# Patient Record
Sex: FEMALE | Race: BLACK OR AFRICAN AMERICAN | NOT HISPANIC OR LATINO | Employment: FULL TIME | URBAN - METROPOLITAN AREA
[De-identification: names, ages, dates, MRNs, and addresses within clinical notes are randomized per-mention and may not be internally consistent; named-entity substitution may affect disease eponyms.]

---
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
## 2022-05-25 ENCOUNTER — HOSPITAL ENCOUNTER (EMERGENCY)
Facility: HOSPITAL | Age: 22
Discharge: HOME | End: 2022-05-25
Attending: EMERGENCY MEDICINE
Payer: MEDICAID

## 2022-05-25 VITALS
DIASTOLIC BLOOD PRESSURE: 71 MMHG | TEMPERATURE: 98 F | RESPIRATION RATE: 18 BRPM | OXYGEN SATURATION: 95 % | WEIGHT: 140 LBS | BODY MASS INDEX: 30.3 KG/M2 | HEART RATE: 77 BPM | SYSTOLIC BLOOD PRESSURE: 118 MMHG

## 2022-05-25 DIAGNOSIS — S39.012A LUMBAR STRAIN, INITIAL ENCOUNTER: Primary | ICD-10-CM

## 2022-05-25 LAB
B-HCG UR QL: NEGATIVE
CTP QC/QA: YES

## 2022-05-25 PROCEDURE — 99284 EMERGENCY DEPT VISIT MOD MDM: CPT | Mod: 25,ER

## 2022-05-25 PROCEDURE — 25000003 PHARM REV CODE 250: Mod: ER | Performed by: NURSE PRACTITIONER

## 2022-05-25 PROCEDURE — 81025 URINE PREGNANCY TEST: CPT | Mod: ER | Performed by: EMERGENCY MEDICINE

## 2022-05-25 RX ORDER — METHOCARBAMOL 500 MG/1
1000 TABLET, FILM COATED ORAL EVERY 8 HOURS PRN
Qty: 18 TABLET | Refills: 0 | Status: SHIPPED | OUTPATIENT
Start: 2022-05-25 | End: 2022-05-28

## 2022-05-25 RX ORDER — NAPROXEN 250 MG/1
500 TABLET ORAL
Status: COMPLETED | OUTPATIENT
Start: 2022-05-25 | End: 2022-05-25

## 2022-05-25 RX ORDER — NAPROXEN 500 MG/1
500 TABLET ORAL EVERY 12 HOURS
Qty: 10 TABLET | Refills: 0 | Status: SHIPPED | OUTPATIENT
Start: 2022-05-25 | End: 2022-05-30

## 2022-05-25 RX ADMIN — NAPROXEN 500 MG: 250 TABLET ORAL at 11:05

## 2022-05-25 NOTE — ED PROVIDER NOTES
"Encounter Date: 5/25/2022    SCRIBE #1 NOTE: I, Renu Byrd, am scribing for, and in the presence of,  SHANTANU Iyer. I have scribed the following portions of the note - Other sections scribed: HPI; ROS.       History     Chief Complaint   Patient presents with    Back Pain     Pt states," I have lower back pain for two weeks. 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 female with no known medical Hx who presents to the ED for chief complaint of intermittent bilateral lower back pain onset 2 weeks ago. 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 She states pain is worse with movement. Patient attempted treatment with some relief. She notes she does not have any medical problems. Patient denies dysuria, difficulty urinating, frequency, bladder incontinence, or bowel incontinence.       The history is provided by the patient. No  was used.     Review of patient's allergies indicates:  No Known Allergies  No past medical history on file.  No past surgical history on file.  No family history on file.  Social History     Tobacco Use    Smoking status: Never Smoker   Substance Use Topics    Alcohol use: No    Drug use: No     Review of Systems   Constitutional: Negative for fever.   HENT: Negative for sore throat and trouble swallowing.    Respiratory: Negative for cough and shortness of breath.    Cardiovascular: Negative for chest pain and leg swelling.   Gastrointestinal: Negative for abdominal pain, diarrhea, nausea and vomiting.   Genitourinary: Negative for difficulty urinating, dysuria and frequency.        (-) Bowel or Bladder Incontinence or Retention   Musculoskeletal: Positive for back pain. Negative for neck pain and neck stiffness.   Skin: Negative for rash and wound.   Neurological: Negative for seizures, syncope, speech difficulty and weakness.   Psychiatric/Behavioral: Negative for confusion. "       Physical Exam     Initial Vitals [05/25/22 1046]   BP Pulse Resp Temp SpO2   (!) 140/91 87 16 98 °F (36.7 °C) 98 %      MAP       --         Physical Exam    Nursing note and vitals reviewed.  Constitutional: She appears well-developed and well-nourished. She is not diaphoretic. She is cooperative.  Non-toxic appearance. She does not have a sickly appearance. She does not appear ill. No distress.   HENT:   Head: Normocephalic and atraumatic.   Right Ear: External ear normal.   Left Ear: External ear normal.   Nose: Nose normal.   Mouth/Throat: Mucous membranes are normal. No trismus in the jaw.   Neck: Phonation normal.   Normal range of motion.  Cardiovascular: Normal rate, regular rhythm and intact distal pulses.   Pulses:       Radial pulses are 2+ on the right side and 2+ on the left side.   Pulmonary/Chest: No respiratory distress. She has no wheezes. She has no rhonchi. She has no rales.   Abdominal: She exhibits no distension. There is no abdominal tenderness. There is no rebound.   Musculoskeletal:      Cervical back: Normal range of motion.      Thoracic back: No tenderness or bony tenderness.      Lumbar back: Tenderness present. No bony tenderness.     Neurological: She is alert and oriented to person, place, and time. No sensory deficit. Coordination normal. GCS eye subscore is 4. GCS verbal subscore is 5. GCS motor subscore is 6.   Skin: Skin is warm, dry and intact. Capillary refill takes less than 2 seconds. No bruising, no laceration and no rash noted. No cyanosis or erythema.   Psychiatric: She has a normal mood and affect. Her speech is normal and behavior is normal. Judgment and thought content normal.         ED Course   Procedures  Labs Reviewed   POCT URINE PREGNANCY          Imaging Results    None          Medications   naproxen tablet 500 mg (500 mg Oral Given 5/25/22 1115)           APC / Resident Notes:   This is an evaluation of a 21 y.o. female that presents to the Emergency  Department for back pain.  Denies fever, rash, night sweats, weight loss, dysuria, bowel/bladder incontinence, immunosuppression or IV\SQ drug use. The patient is a non-toxic, afebrile, and well appearing female. On physical exam, there is tenderness to the bilateral lower lumbar muscular back. There is no abdominal tenderness to palpation, CVA tenderness, saddle anesthesia, rash, erythema, or open wounds.  No midline step-off, crepitus, or tenderness of the lumbar spine.  Strength and sensation are symmetric bilaterally. Pulses are symmetrical. Vital signs reassuring. RESULTS:  UPT negative.    Given the above findings, my overall impression is lumbar back pain/strain. Given the above findings, I do not think the patient has acute vertebral fracture, subluxation, dislocation, sciatica, epidural abscess, cauda equina, shingles, UTI, pyelonephritis.    D/C Meds:  Naproxen and Robaxin. D/C Information:  Back pain discharge instructions, primary care follow-up as this may require additional care including but not limited to physical therapy. The diagnosis, treatment plan, instructions for follow-up and reevaluation with primary care as well as ED return precautions were discussed and understanding was verbalized. All questions or concerns have been addressed.  CAROLE Horne FNP-C     Scribe Attestation:   Scribe #1: I performed the above scribed service and the documentation accurately describes the services I performed. I attest to the accuracy of the note.               Scribe attestation: I, CAROLE Horne FNP-C, personally performed the services described in this documentation.  All medical record entries made by the scribe were at my direction and in my presence.  I have reviewed the chart and agree that the record reflects my personal performance and is accurate and complete.    Clinical Impression:   Final diagnoses:  [S39.012A] Lumbar strain, initial encounter (Primary)          ED Disposition Condition     Discharge Stable        ED Prescriptions     Medication Sig Dispense Start Date End Date Auth. Provider    naproxen (NAPROSYN) 500 MG tablet Take 1 tablet (500 mg total) by mouth every 12 (twelve) hours. Do not take any additional NSAIDs while you are taking this medication including (Advil, Aleve, Motrin, Ibuprofen, Mobic\meloxicam, Naprosyn, Toradol, ketoralac, etc.). for 5 days 10 tablet 5/25/2022 5/30/2022 SHANTANU Ramirez    methocarbamoL (ROBAXIN) 500 MG Tab Take 2 tablets (1,000 mg total) by mouth every 8 (eight) hours as needed (Muscle Spasm/Pain). 18 tablet 5/25/2022 5/28/2022 SHANTANU Ramirez        Follow-up Information     Follow up With Specialties Details Why Contact Info    Your Primary Care Doctor  Schedule an appointment as soon as possible for a visit  Please call and schedule an appointment for follow up this week.     [facility] ED Emergency Medicine Go to  If symptoms worsen [address] [facility] [address] [phone]           SHANTANU Ramirez  05/25/22 6637

## 2022-05-25 NOTE — DISCHARGE INSTRUCTIONS
§ Please return to the Emergency Department for any new or worsening symptoms including: fever, chest pain, shortness of breath, loss of consciousness, dizziness, weakness, or any other concerns.     § Schedule an appointment for follow up with your Primary Care Doctor as soon as possible for a recheck of your symptoms. If you do not have one, contact the one listed on your discharge paperwork or call the [facility] Appointment Desk at [phone] to schedule an appointment.     § Please take all medication as prescribed. You have been prescribed Naproxen for pain. This is an Non-Steroidal Anti-Inflammatory (NSAID) Medication. Please do not take any additional NSAIDs while you are taking this medication including (Advil, Aleve, Motrin, Ibuprofen, Mobic\meloxicam, Naprosyn, Toradol, ketoralac, etc.). Please stop taking this medication if you experience: weakness, itching, yellow skin or eyes, joint pains, vomiting blood, blood or black stools, unusual weight gain, or swelling in your arms, legs, hands, or feet.     You have been prescribed Robaxin (Methocarbamol) for muscle spasms/pain. Please do not take this medication while working, drinking alcohol, swimming, or while driving/operating heavy machinery. This medication may cause drowsiness, dizziness, impair judgment, and reduce physical capabilities.You should not drive, operate heavy machinery, or make life changing decisions while taking this medication.

## 2022-12-07 ENCOUNTER — HOSPITAL ENCOUNTER (EMERGENCY)
Facility: HOSPITAL | Age: 22
Discharge: HOME | End: 2022-12-07
Attending: EMERGENCY MEDICINE
Payer: MEDICAID

## 2022-12-07 ENCOUNTER — HOSPITAL ENCOUNTER (EMERGENCY)
Facility: OTHER | Age: 22
Discharge: HOME | End: 2022-12-07
Attending: EMERGENCY MEDICINE
Payer: MEDICAID

## 2022-12-07 VITALS
DIASTOLIC BLOOD PRESSURE: 81 MMHG | HEART RATE: 98 BPM | SYSTOLIC BLOOD PRESSURE: 124 MMHG | HEIGHT: 57 IN | TEMPERATURE: 98 F | BODY MASS INDEX: 3.67 KG/M2 | WEIGHT: 17 LBS | RESPIRATION RATE: 18 BRPM | OXYGEN SATURATION: 99 %

## 2022-12-07 VITALS
SYSTOLIC BLOOD PRESSURE: 134 MMHG | BODY MASS INDEX: 32.36 KG/M2 | HEART RATE: 89 BPM | WEIGHT: 150 LBS | HEIGHT: 57 IN | RESPIRATION RATE: 17 BRPM | TEMPERATURE: 99 F | DIASTOLIC BLOOD PRESSURE: 79 MMHG | OXYGEN SATURATION: 100 %

## 2022-12-07 DIAGNOSIS — R23.8 VESICULAR RASH: Primary | ICD-10-CM

## 2022-12-07 DIAGNOSIS — T50.905A ADVERSE EFFECT OF DRUG, INITIAL ENCOUNTER: Primary | ICD-10-CM

## 2022-12-07 LAB
B-HCG UR QL: NEGATIVE
CTP QC/QA: YES

## 2022-12-07 PROCEDURE — 99282 EMERGENCY DEPT VISIT SF MDM: CPT | Mod: 27,ER

## 2022-12-07 PROCEDURE — 87529 HSV DNA AMP PROBE: CPT | Performed by: EMERGENCY MEDICINE

## 2022-12-07 PROCEDURE — 99283 EMERGENCY DEPT VISIT LOW MDM: CPT

## 2022-12-07 PROCEDURE — 81025 URINE PREGNANCY TEST: CPT | Mod: ER | Performed by: EMERGENCY MEDICINE

## 2022-12-07 RX ORDER — VALACYCLOVIR HYDROCHLORIDE 1 G/1
1000 TABLET, FILM COATED ORAL 3 TIMES DAILY
Qty: 21 TABLET | Refills: 0 | Status: SHIPPED | OUTPATIENT
Start: 2022-12-07 | End: 2023-08-15

## 2022-12-07 NOTE — ED TRIAGE NOTES
"Reports painful "bumps" on inner right thigh region, onset today. Recently completed treatment for trichomas approx. 2 weeks ago. t  "

## 2022-12-08 NOTE — ED PROVIDER NOTES
Encounter Date: 12/7/2022       History     Chief Complaint   Patient presents with    Medication Reaction     PT REPORTS PLACED ON PO VALTREX TODAY AT [facility] AND REPORTS HAS TAKEN 2 PILLS, EACH TIME PT REPORTS IT MAKES HER FEEL JITTERY, PT REQUESTING A DIFFERENT MEDICATION     This patient presents to the emergency department with request for change in medication.  Patient states she took 2 doses of Valtrex and had a dry sensation in her mouth made her feel a little jittery as well.  She denies any respiratory difficulties or any rashes or swelling and has no other complaints of at the present time.    The history is provided by the patient.   Review of patient's allergies indicates:  No Known Allergies  History reviewed. No pertinent past medical history.  History reviewed. No pertinent surgical history.  No family history on file.  Social History     Tobacco Use    Smoking status: Never    Smokeless tobacco: Never   Substance Use Topics    Alcohol use: No    Drug use: No     Review of Systems   Constitutional: Negative.    HENT: Negative.     Eyes: Negative.    Respiratory: Negative.     Cardiovascular: Negative.    Gastrointestinal: Negative.    Endocrine: Negative.    Genitourinary: Negative.    Musculoskeletal: Negative.    Skin:  Positive for rash.   Allergic/Immunologic: Negative.    Neurological: Negative.    Hematological: Negative.    Psychiatric/Behavioral: Negative.     All other systems reviewed and are negative.    Physical Exam     Initial Vitals [12/07/22 2157]   BP Pulse Resp Temp SpO2   124/81 98 18 98.4 °F (36.9 °C) 99 %      MAP       --         Physical Exam    Nursing note and vitals reviewed.  Constitutional: Vital signs are normal. She appears well-developed. She is active and cooperative.   HENT:   Head: Normocephalic and atraumatic.   Eyes: Conjunctivae, EOM and lids are normal. Pupils are equal, round, and reactive to light.   Neck: Trachea normal. Neck supple. No thyroid mass present.     Full passive range of motion without pain.     Cardiovascular:  Normal rate, regular rhythm, S1 normal, S2 normal, normal heart sounds, intact distal pulses and normal pulses.           Abdominal: Abdomen is soft. Bowel sounds are normal.   Musculoskeletal:         General: Normal range of motion.      Cervical back: Full passive range of motion without pain and neck supple.     Lymphadenopathy:     She has no axillary adenopathy.   Neurological: She is alert and oriented to person, place, and time.   Skin: Skin is warm, dry and intact.        Psychiatric: She has a normal mood and affect. Her speech is normal and behavior is normal. Judgment and thought content normal. Cognition and memory are normal.       ED Course   Procedures  Labs Reviewed   POCT URINE PREGNANCY        Results for orders placed or performed during the hospital encounter of 12/07/22   POCT urine pregnancy   Result Value Ref Range    POC Preg Test, Ur Negative Negative     Acceptable Yes          Imaging Results    None          Medications - No data to display  Medical Decision Making:   ED Management:  Patient is not having any significant signs or symptoms associated with taking this medication feel it is appropriate that she continue on this medication (cost benefit would recommend that she continue taking the medication).                        Clinical Impression:   Final diagnoses:  [T50.905A] Adverse effect of drug, initial encounter (Primary)      ED Disposition Condition    Discharge Stable          ED Prescriptions    None       Follow-up Information       Follow up With Specialties Details Why Contact Info    Your PCP  Schedule an appointment as soon as possible for a visit in 3 days               Ayan Whitney MD  12/08/22 8066

## 2022-12-10 LAB
HSV1 DNA SPEC QL NAA+PROBE: NEGATIVE
HSV2 DNA SPEC QL NAA+PROBE: NEGATIVE
SPECIMEN SOURCE: NORMAL

## 2023-05-03 ENCOUNTER — OFFICE VISIT (OUTPATIENT)
Dept: OBSTETRICS AND GYNECOLOGY | Facility: CLINIC | Age: 23
End: 2023-05-03
Payer: MEDICAID

## 2023-05-03 VITALS
SYSTOLIC BLOOD PRESSURE: 124 MMHG | WEIGHT: 167.13 LBS | DIASTOLIC BLOOD PRESSURE: 78 MMHG | BODY MASS INDEX: 36.16 KG/M2

## 2023-05-03 DIAGNOSIS — N76.0 ACUTE VAGINITIS: Primary | ICD-10-CM

## 2023-05-03 DIAGNOSIS — E66.9 OBESITY (BMI 35.0-39.9 WITHOUT COMORBIDITY): ICD-10-CM

## 2023-05-03 DIAGNOSIS — Z30.9 ENCOUNTER FOR CONTRACEPTIVE MANAGEMENT, UNSPECIFIED TYPE: ICD-10-CM

## 2023-05-03 PROCEDURE — 3074F PR MOST RECENT SYSTOLIC BLOOD PRESSURE < 130 MM HG: ICD-10-PCS | Mod: CPTII,,, | Performed by: ADVANCED PRACTICE MIDWIFE

## 2023-05-03 PROCEDURE — 1159F PR MEDICATION LIST DOCUMENTED IN MEDICAL RECORD: ICD-10-PCS | Mod: CPTII,,, | Performed by: ADVANCED PRACTICE MIDWIFE

## 2023-05-03 PROCEDURE — 99999 PR PBB SHADOW E&M-EST. PATIENT-LVL III: ICD-10-PCS | Mod: PBBFAC,,, | Performed by: ADVANCED PRACTICE MIDWIFE

## 2023-05-03 PROCEDURE — 99213 OFFICE O/P EST LOW 20 MIN: CPT | Mod: PBBFAC,PN | Performed by: ADVANCED PRACTICE MIDWIFE

## 2023-05-03 PROCEDURE — 3008F BODY MASS INDEX DOCD: CPT | Mod: CPTII,,, | Performed by: ADVANCED PRACTICE MIDWIFE

## 2023-05-03 PROCEDURE — 1159F MED LIST DOCD IN RCRD: CPT | Mod: CPTII,,, | Performed by: ADVANCED PRACTICE MIDWIFE

## 2023-05-03 PROCEDURE — 3078F PR MOST RECENT DIASTOLIC BLOOD PRESSURE < 80 MM HG: ICD-10-PCS | Mod: CPTII,,, | Performed by: ADVANCED PRACTICE MIDWIFE

## 2023-05-03 PROCEDURE — 99999 PR PBB SHADOW E&M-EST. PATIENT-LVL III: CPT | Mod: PBBFAC,,, | Performed by: ADVANCED PRACTICE MIDWIFE

## 2023-05-03 PROCEDURE — 81514 NFCT DS BV&VAGINITIS DNA ALG: CPT | Performed by: ADVANCED PRACTICE MIDWIFE

## 2023-05-03 PROCEDURE — 3008F PR BODY MASS INDEX (BMI) DOCUMENTED: ICD-10-PCS | Mod: CPTII,,, | Performed by: ADVANCED PRACTICE MIDWIFE

## 2023-05-03 PROCEDURE — 99202 PR OFFICE/OUTPT VISIT, NEW, LEVL II, 15-29 MIN: ICD-10-PCS | Mod: S$PBB,,, | Performed by: ADVANCED PRACTICE MIDWIFE

## 2023-05-03 PROCEDURE — 3078F DIAST BP <80 MM HG: CPT | Mod: CPTII,,, | Performed by: ADVANCED PRACTICE MIDWIFE

## 2023-05-03 PROCEDURE — 87591 N.GONORRHOEAE DNA AMP PROB: CPT | Performed by: ADVANCED PRACTICE MIDWIFE

## 2023-05-03 PROCEDURE — 3074F SYST BP LT 130 MM HG: CPT | Mod: CPTII,,, | Performed by: ADVANCED PRACTICE MIDWIFE

## 2023-05-03 PROCEDURE — 99202 OFFICE O/P NEW SF 15 MIN: CPT | Mod: S$PBB,,, | Performed by: ADVANCED PRACTICE MIDWIFE

## 2023-05-03 NOTE — PROGRESS NOTES
Carri Garcia is a 22 y.o. female  presents to  GYN Clinic with complaint of vaginal discharge . She denies itching, and  reports odor.  She states the discharge is white.  Pt was treated for trichomoniasis at an outside clinic and desires to check if it has cleared up. Pt reports hx of HSV- culture thru [facility] was negative but pt reports outside clinic tested her blood and she was positive. Pt also with report of missed cycle in April , pt is sexually active and does not use a BCM. Desires to start.       ROS:  GENERAL: No fever, chills, fatigability or weight loss.  VULVAR: No pain, no lesions and no itching.  VAGINAL: No relaxation, no abnormal bleeding and no lesions.Reports discharge and odor  ABDOMEN: No abdominal pain. Denies nausea. Denies vomiting. No diarrhea. No constipation  URINARY: No incontinence, no nocturia, no frequency and no dysuria.      Review of patient's allergies indicates:  No Known Allergies    Current Outpatient Medications:     acetaminophen (TYLENOL) 500 MG tablet, Take 2 tablets (1,000 mg total) by mouth every 6 (six) hours as needed. (Patient not taking: Reported on 5/3/2023), Disp: 30 tablet, Rfl: 0    cetirizine (ZYRTEC) 10 MG tablet, Take 1 tablet (10 mg total) by mouth once daily., Disp: 30 tablet, Rfl: 0    clindamycin (CLEOCIN) 150 MG capsule, Take 2 capsules (300 mg total) by mouth every 8 (eight) hours. (Patient not taking: Reported on 5/3/2023), Disp: 42 capsule, Rfl: 0    erythromycin (ROMYCIN) ophthalmic ointment, Place a 1/2 inch ribbon of ointment into the lower eyelid four times per day for 7 days (Patient not taking: Reported on 5/3/2023), Disp: 1 Tube, Rfl: 0    valACYclovir (VALTREX) 1000 MG tablet, Take 1 tablet (1,000 mg total) by mouth 3 (three) times daily. for 7 days, Disp: 21 tablet, Rfl: 0    History reviewed. No pertinent past medical history.  History reviewed. No pertinent surgical history.  Social History     Tobacco Use    Smoking status: Never     Smokeless tobacco: Never   Substance Use Topics    Alcohol use: No    Drug use: No     OB History    Para Term  AB Living   0 0 0 0 0 0   SAB IAB Ectopic Multiple Live Births   0 0 0 0 0       /78   Wt 75.8 kg (167 lb 1.7 oz)   LMP 2023   BMI 36.16 kg/m²     PHYSICAL EXAM:  GENERAL: Calm and appropriate affect, alert, oriented x4  SKIN: Color appropriate for race, warm and dry, clean and intact with no rashes.  RESP: Even, unlabored breathing  ABDOMEN: Soft, nontender, no masses.  :   Normal external female genitalia without lesions. Normal hair distribution. Adequate perineal body, normal urethral meatus.  Vagina pink and well rugated, no lesions, vaginal discharge -  white, thin, minimal odor.  No significant cystocele or rectocele.  Cervix -no cervical motion tenderness.      ASSESSMENT / PLAN:    ICD-10-CM ICD-9-CM    1. Acute vaginitis  N76.0 616.10 Vaginosis Screen by DNA Probe      C. trachomatis/N. gonorrhoeae by AMP DNA [facility]; Cervicovaginal      2. Encounter for contraceptive management, unspecified type  Z30.9 V25.9 Device Authorization Order      3. Obesity (BMI 35.0-39.9 without comorbidity)  E66.9 278.00         Acute vaginitis  -     Vaginosis Screen by DNA Probe  -     C. trachomatis/N. gonorrhoeae by AMP DNA [facility]; Cervicovaginal    Encounter for contraceptive management, unspecified type  -     Device Authorization Order    Obesity (BMI 35.0-39.9 without comorbidity)    Lengthy discussion with pt re active HSV outbreak and positive antibody screen. Advised to come in for evaluation next time she feel she is having an outbreak.     Advised negative UPT today, discussed risk of pregnancy with unprotected intercourse- pt does not desire pregnancy. Discussed options for BCM and pt desires to use Nexplanon as has used in the past. Appt scheduled.      Patient was counseled today on vaginitis prevention including :  a. avoiding feminine products such as deoderant soaps,  body wash, bubble bath, douches, scented toilet paper, deoderant tampons or pads, feminine wipes, chronic pad use, etc.  b. avoiding other vulvovaginal irritants such as long hot baths, humidity, tight, synthetic clothing, chlorine and sitting around in wet bathing suits  c. wearing cotton underwear, avoiding thong underwear and no underwear to bed  d. taking showers instead of baths and use a hair dryer on cool setting afterwards to dry  e. wearing cotton to exercise and shower immediately after exercise and change clothes  f. using polyurethane condoms without spermicide if sexually active and symptoms are triggered by intercourse  g. Discussed use of vagisil, along with repHresh and probiotics    FOLLOW UP:   Pending lab results, PRN lack of improvement.

## 2023-05-04 ENCOUNTER — PATIENT MESSAGE (OUTPATIENT)
Dept: OBSTETRICS AND GYNECOLOGY | Facility: CLINIC | Age: 23
End: 2023-05-04
Payer: MEDICAID

## 2023-05-04 LAB
BACTERIAL VAGINOSIS DNA: NEGATIVE
C TRACH DNA SPEC QL NAA+PROBE: NOT DETECTED
CANDIDA GLABRATA DNA: NEGATIVE
CANDIDA KRUSEI DNA: NEGATIVE
CANDIDA RRNA VAG QL PROBE: NEGATIVE
N GONORRHOEA DNA SPEC QL NAA+PROBE: NOT DETECTED
T VAGINALIS RRNA GENITAL QL PROBE: POSITIVE

## 2023-05-05 ENCOUNTER — PATIENT MESSAGE (OUTPATIENT)
Dept: OBSTETRICS AND GYNECOLOGY | Facility: CLINIC | Age: 23
End: 2023-05-05
Payer: MEDICAID

## 2023-05-05 DIAGNOSIS — A59.9 TRICHIMONIASIS: Primary | ICD-10-CM

## 2023-05-05 RX ORDER — METRONIDAZOLE 500 MG/1
500 TABLET ORAL 2 TIMES DAILY
Qty: 14 TABLET | Refills: 1 | Status: SHIPPED | OUTPATIENT
Start: 2023-05-05 | End: 2023-05-12

## 2023-05-10 ENCOUNTER — PATIENT MESSAGE (OUTPATIENT)
Dept: OBSTETRICS AND GYNECOLOGY | Facility: CLINIC | Age: 23
End: 2023-05-10
Payer: MEDICAID

## 2023-05-15 ENCOUNTER — PROCEDURE VISIT (OUTPATIENT)
Dept: OBSTETRICS AND GYNECOLOGY | Facility: CLINIC | Age: 23
End: 2023-05-15
Payer: MEDICAID

## 2023-05-15 VITALS
SYSTOLIC BLOOD PRESSURE: 122 MMHG | HEIGHT: 57 IN | DIASTOLIC BLOOD PRESSURE: 70 MMHG | WEIGHT: 163.13 LBS | BODY MASS INDEX: 35.19 KG/M2

## 2023-05-15 DIAGNOSIS — Z30.017 NEXPLANON INSERTION: Primary | ICD-10-CM

## 2023-05-15 LAB
B-HCG UR QL: NEGATIVE
CTP QC/QA: YES

## 2023-05-15 PROCEDURE — 11981 INSERTION DRUG DLVR IMPLANT: CPT | Mod: PBBFAC,PN | Performed by: OBSTETRICS & GYNECOLOGY

## 2023-05-15 PROCEDURE — 11981 INSERTION OF NEXPLANON DEVICE: ICD-10-PCS | Mod: S$PBB,,, | Performed by: OBSTETRICS & GYNECOLOGY

## 2023-05-15 PROCEDURE — 81025 URINE PREGNANCY TEST: CPT | Mod: PBBFAC,PN | Performed by: OBSTETRICS & GYNECOLOGY

## 2023-05-15 RX ADMIN — ETONOGESTREL 68 MG: 68 IMPLANT SUBCUTANEOUS at 11:05

## 2023-05-15 NOTE — PROCEDURES
Insertion of Nexplanon Device    Date/Time: 5/15/2023 11:00 AM  Performed by: ROCKY Alcantar MD  Authorized by: ROCKY Alcantar MD     Consent obtained:  Written and verbal  Consent given by:  Patient  Patient questions answered: yes    Patient agrees, verbalizes understanding, and wants to proceed: yes    Instructions and paperwork completed: yes    Prepped with: alcohol 70% and povidone-iodine    Local anesthetic:  Lidocaine without epinephrine  The site was cleaned and prepped in a sterile fashion: yes    Small stab incision was made in arm: yes    Left/right:  Left   68 mg etonogestreL 68 mg  Preloaded Implanon trocar was placed subdermally: yes    Visualization of implant was obtained: yes    Nexplanon was inserted and trocar removed: yes    Visualization of notch in stilette and palpitation of device: yes    Palpitation confirms placement by provider and patient: yes    Site was closed with steri-strips and pressure bandage applied: yes      Patient was counseled on the risks of the procedure including pain, bleeding, and infection.  Patient acknowledged risks, and all questions were answered.  Consents signed.    UPT negative.    Insertion site was prepped with povidine iodine and injected with 3-cc of lidocaine.  Implant was inserted with the 's provided device.  Patient palpated the device.  Site bandaged and wrapped.  Patient instructed to keep bandage on for 24 hours.    Arm: LEFT   Lot: [id number]   Exp. Date: 4/7/2025   Supply: BUY AND BILL     Pain, bleeding, and infection precautions given.  Patient instructed to use back-up contraception for 2 weeks following placement

## 2023-06-12 ENCOUNTER — HOSPITAL ENCOUNTER (EMERGENCY)
Facility: OTHER | Age: 23
Discharge: HOME | End: 2023-06-12
Attending: EMERGENCY MEDICINE
Payer: MEDICAID

## 2023-06-12 ENCOUNTER — NURSE TRIAGE (OUTPATIENT)
Dept: ADMINISTRATIVE | Facility: CLINIC | Age: 23
End: 2023-06-12
Payer: MEDICAID

## 2023-06-12 ENCOUNTER — PATIENT MESSAGE (OUTPATIENT)
Dept: EMERGENCY MEDICINE | Facility: OTHER | Age: 23
End: 2023-06-12
Payer: MEDICAID

## 2023-06-12 VITALS
WEIGHT: 160 LBS | HEART RATE: 102 BPM | SYSTOLIC BLOOD PRESSURE: 128 MMHG | HEIGHT: 57 IN | RESPIRATION RATE: 18 BRPM | DIASTOLIC BLOOD PRESSURE: 77 MMHG | TEMPERATURE: 99 F | OXYGEN SATURATION: 99 % | BODY MASS INDEX: 34.52 KG/M2

## 2023-06-12 DIAGNOSIS — N92.1 BREAKTHROUGH BLEEDING ON NEXPLANON: ICD-10-CM

## 2023-06-12 DIAGNOSIS — R00.0 TACHYCARDIA: ICD-10-CM

## 2023-06-12 DIAGNOSIS — B96.89 BACTERIAL VAGINOSIS: Primary | ICD-10-CM

## 2023-06-12 DIAGNOSIS — A59.9 TRICHOMONAS VAGINALIS INFECTION: ICD-10-CM

## 2023-06-12 DIAGNOSIS — Z97.5 BREAKTHROUGH BLEEDING ON NEXPLANON: ICD-10-CM

## 2023-06-12 DIAGNOSIS — N93.9 VAGINAL BLEEDING: Primary | ICD-10-CM

## 2023-06-12 DIAGNOSIS — N76.0 BACTERIAL VAGINOSIS: Primary | ICD-10-CM

## 2023-06-12 LAB
B-HCG UR QL: NEGATIVE
BACTERIA GENITAL QL WET PREP: ABNORMAL
BASOPHILS # BLD AUTO: 0.03 K/UL (ref 0–0.2)
BASOPHILS NFR BLD: 0.3 % (ref 0–1.9)
CLUE CELLS VAG QL WET PREP: ABNORMAL
CTP QC/QA: YES
DIFFERENTIAL METHOD: ABNORMAL
EOSINOPHIL # BLD AUTO: 0.1 K/UL (ref 0–0.5)
EOSINOPHIL NFR BLD: 0.4 % (ref 0–8)
ERYTHROCYTE [DISTWIDTH] IN BLOOD BY AUTOMATED COUNT: 18.2 % (ref 11.5–14.5)
FILAMENT FUNGI VAG WET PREP-#/AREA: ABNORMAL
HCT VFR BLD AUTO: 46.6 % (ref 37–48.5)
HGB BLD-MCNC: 14.3 G/DL (ref 12–16)
IMM GRANULOCYTES # BLD AUTO: 0.03 K/UL (ref 0–0.04)
IMM GRANULOCYTES NFR BLD AUTO: 0.3 % (ref 0–0.5)
LYMPHOCYTES # BLD AUTO: 2.8 K/UL (ref 1–4.8)
LYMPHOCYTES NFR BLD: 24.1 % (ref 18–48)
MCH RBC QN AUTO: 21.3 PG (ref 27–31)
MCHC RBC AUTO-ENTMCNC: 30.7 G/DL (ref 32–36)
MCV RBC AUTO: 70 FL (ref 82–98)
MONOCYTES # BLD AUTO: 0.6 K/UL (ref 0.3–1)
MONOCYTES NFR BLD: 5.1 % (ref 4–15)
NEUTROPHILS # BLD AUTO: 8 K/UL (ref 1.8–7.7)
NEUTROPHILS NFR BLD: 69.8 % (ref 38–73)
NRBC BLD-RTO: 0 /100 WBC
PLATELET # BLD AUTO: 347 K/UL (ref 150–450)
PMV BLD AUTO: 10.3 FL (ref 9.2–12.9)
RBC # BLD AUTO: 6.7 M/UL (ref 4–5.4)
SPECIMEN SOURCE: ABNORMAL
T VAGINALIS GENITAL QL WET PREP: ABNORMAL
WBC # BLD AUTO: 11.43 K/UL (ref 3.9–12.7)
WBC #/AREA VAG WET PREP: ABNORMAL
YEAST GENITAL QL WET PREP: ABNORMAL

## 2023-06-12 PROCEDURE — 87210 SMEAR WET MOUNT SALINE/INK: CPT

## 2023-06-12 PROCEDURE — 81025 URINE PREGNANCY TEST: CPT | Performed by: PHYSICIAN ASSISTANT

## 2023-06-12 PROCEDURE — 93010 EKG 12-LEAD: ICD-10-PCS | Mod: ,,, | Performed by: INTERNAL MEDICINE

## 2023-06-12 PROCEDURE — 99284 EMERGENCY DEPT VISIT MOD MDM: CPT

## 2023-06-12 PROCEDURE — 93005 ELECTROCARDIOGRAM TRACING: CPT

## 2023-06-12 PROCEDURE — 93010 ELECTROCARDIOGRAM REPORT: CPT | Mod: ,,, | Performed by: INTERNAL MEDICINE

## 2023-06-12 PROCEDURE — 25000003 PHARM REV CODE 250

## 2023-06-12 PROCEDURE — 85025 COMPLETE CBC W/AUTO DIFF WBC: CPT | Performed by: PHYSICIAN ASSISTANT

## 2023-06-12 RX ORDER — IBUPROFEN 600 MG/1
600 TABLET ORAL
Status: COMPLETED | OUTPATIENT
Start: 2023-06-12 | End: 2023-06-12

## 2023-06-12 RX ORDER — NAPROXEN 500 MG/1
500 TABLET ORAL 2 TIMES DAILY WITH MEALS
Qty: 10 TABLET | Refills: 0 | Status: SHIPPED | OUTPATIENT
Start: 2023-06-12 | End: 2023-06-17

## 2023-06-12 RX ORDER — IBUPROFEN 600 MG/1
600 TABLET ORAL EVERY 6 HOURS PRN
Qty: 20 TABLET | Refills: 0 | Status: SHIPPED | OUTPATIENT
Start: 2023-06-12 | End: 2023-06-12 | Stop reason: ALTCHOICE

## 2023-06-12 RX ORDER — METRONIDAZOLE 500 MG/1
500 TABLET ORAL 2 TIMES DAILY
Qty: 14 TABLET | Refills: 0 | Status: SHIPPED | OUTPATIENT
Start: 2023-06-12 | End: 2023-06-19

## 2023-06-12 RX ADMIN — IBUPROFEN 600 MG: 600 TABLET ORAL at 03:06

## 2023-06-12 NOTE — ED PROVIDER NOTES
Encounter Date: 6/12/2023       History     Chief Complaint   Patient presents with    Vaginal Bleeding     Heavy vaginal bleeding with menstrual cycle. Saturating 1 tampon/hour.      22-year-old female presents to the emergency department for evaluation of heavy vaginal bleeding for the past 2 days.  States that she had a Nexplanon implant placed on 5/15/23 by Dr. Alcantar (OB). She reports soaking through 3 pads yesterday and 3 pads and 2 tampons today. Patient is unable to recall date of last menstrual cycle but states she hasn't had a period in months. She does report history of heavy menstrual cycles. She reports associated intermittent, pelvic cramping. She denies fever, headaches, dizziness, light-headedness, generalized weakness, paresthesias, cold symptoms, cough, SOB, chest pain, nausea, vomiting, diarrhea, constipation, urinary symptoms, or vaginal discharge.     The history is provided by the patient.   Review of patient's allergies indicates:  No Known Allergies  History reviewed. No pertinent past medical history.  History reviewed. No pertinent surgical history.  Family History   Problem Relation Age of Onset    Breast cancer Neg Hx     Colon cancer Neg Hx     Ovarian cancer Neg Hx      Social History     Tobacco Use    Smoking status: Never    Smokeless tobacco: Never   Substance Use Topics    Alcohol use: No    Drug use: No     Review of Systems   Constitutional:  Negative for chills and fever.   HENT:  Negative for congestion, rhinorrhea and sore throat.    Respiratory:  Negative for cough and shortness of breath.    Cardiovascular:  Negative for chest pain.   Gastrointestinal:  Negative for abdominal pain, constipation, diarrhea, nausea and vomiting.   Genitourinary:  Positive for pelvic pain and vaginal bleeding. Negative for dysuria, frequency, urgency and vaginal discharge.   Musculoskeletal:  Negative for back pain.   Skin:  Negative for rash.   Neurological:  Negative for dizziness, weakness,  light-headedness, numbness and headaches.   Psychiatric/Behavioral:  Negative for confusion.      Physical Exam     Initial Vitals [06/12/23 1403]   BP Pulse Resp Temp SpO2   121/72 (!) 112 16 99.4 °F (37.4 °C) 99 %      MAP       --         Physical Exam    Nursing note and vitals reviewed.  Constitutional: She appears well-developed and well-nourished. No distress.   HENT:   Head: Normocephalic and atraumatic.   Mouth/Throat: Oropharynx is clear and moist.   Eyes: Conjunctivae and EOM are normal.   No pallor or icterus.    Neck: Neck supple.   Normal range of motion.  Cardiovascular:  Normal rate, regular rhythm, normal heart sounds and intact distal pulses.           Pulmonary/Chest: Breath sounds normal. No respiratory distress. She has no wheezes. She has no rhonchi. She has no rales.   Abdominal: Abdomen is soft. Bowel sounds are normal. She exhibits no distension and no mass. There is no abdominal tenderness.   No abdominal tenderness to palpation.  There is no rebound and no guarding.   Genitourinary:    Genitourinary Comments: Pelvic exam: Female nurse present. Normal external genitalia. Blood present in vault. No signs of hemorrhage. Cervical os closed. CMT present. No adnexal tenderness to palpation.      Musculoskeletal:         General: Normal range of motion.      Cervical back: Normal range of motion and neck supple.     Neurological: She is alert and oriented to person, place, and time. She has normal strength.   Skin: Skin is warm and dry. No rash noted. No pallor.   Psychiatric: She has a normal mood and affect. Her behavior is normal. Judgment and thought content normal.       ED Course   Procedures  Labs Reviewed   VAGINAL SCREEN - Abnormal; Notable for the following components:       Result Value    Trichomonas Occasional (*)     Clue Cells Rare (*)     WBC - Vaginal Screen Rare (*)     Bacteria - Vaginal Screen Rare (*)     All other components within normal limits    Narrative:     Release to  patient->Immediate   CBC W/ AUTO DIFFERENTIAL - Abnormal; Notable for the following components:    RBC 6.70 (*)     MCV 70 (*)     MCH 21.3 (*)     MCHC 30.7 (*)     RDW 18.2 (*)     Gran # (ANC) 8.0 (*)     All other components within normal limits   POCT URINE PREGNANCY - Abnormal          Imaging Results    None          Medications   ibuprofen tablet 600 mg (600 mg Oral Given 6/12/23 1527)     Medical Decision Making:   Initial Assessment:   Urgent evaluation of 22-year-old female who presents with heavy vaginal bleeding and intermittent pelvic cramping that began two days ago.  Had Nexplanon implant placed on 5/15/23. Unable to recall last menstrual cycle but states it has been a few months.  Currently denies symptoms related to anemia.  On exam, she has no focal abdominal tenderness to palpation. On pelvic exam, there is blood in the vault. No signs of hemorrhaging. Cervical motion tenderness present. No adnexal tenderness to palpation. Will check CBC, UPT and vaginal screen. Will give ibuprofen.   Clinical Tests:   Lab Tests: Ordered and Reviewed  ED Management:  On review of labs, no leukocytosis.  H&H is stable.  UPT is negative.  Vaginal screen reveals occasional Trichomonas and rare clue cells. Plan to treat with metronidazole. Case discussed with supervising physician. Given short duration of vaginal bleeding and no signs or symptoms of anemia, okay to discharge home with instructions to monitor amount of bleeding for the next few days. Explained to her that heavy bleeding is not unusual when she hasn't had a cycle in many months and when hormonal contraception is recently implanted. Advised to follow up with her OB if symptoms persist or worsen. Recommended Aleve for pain.  She verbalized understanding and agreement with this plan of care.  She was given specific return precautions.  All questions and concerns addressed.  She is stable for discharge.                        Clinical Impression:   Final  diagnoses:  [R00.0] Tachycardia  [N93.9] Vaginal bleeding (Primary)  [N92.1, Z97.5] Breakthrough bleeding on Nexplanon        ED Disposition Condition    Discharge Stable          ED Prescriptions       Medication Sig Dispense Start Date End Date Auth. Provider    ibuprofen (ADVIL,MOTRIN) 600 MG tablet  (Status: Discontinued) Take 1 tablet (600 mg total) by mouth every 6 (six) hours as needed for Pain. 20 tablet 6/12/2023 6/12/2023 James Pelayo PA-C    naproxen (NAPROSYN) 500 MG tablet Take 1 tablet (500 mg total) by mouth 2 (two) times daily with meals. for 5 days 10 tablet 6/12/2023 6/17/2023 James Pelayo PA-C          Follow-up Information    None          James Pelayo PA-C  06/12/23 2549

## 2023-06-12 NOTE — DISCHARGE INSTRUCTIONS
Please continue to wear pads and tampons as needed.  Please take naproxen as needed for your cramping.  Please follow up with your OB if symptoms persist or worsen.

## 2023-06-12 NOTE — TELEPHONE ENCOUNTER
Pt called for c/o vaginal bleeding she said its her 1st cycle since nexplant was placed and she is very weak that she is in the bed and cramping and pt said that she has bleed through the biggest pad and tampon and has lots of clots in the toilet. Pt triaged and care advice to call 911. Pt called and someone had sent a message to provider. Pt told to reach back out if any other questions or concerns once she gets back home.               Reason for Disposition   SEVERE vaginal bleeding (e.g., continuous red blood from vagina, or large blood clots) and very weak (can't stand)    Protocols used: Vaginal Bleeding - [id number]

## 2023-06-12 NOTE — ED TRIAGE NOTES
Pt arrived with c/o vaginal bleeding x 3 days.  Pt states she had Nexplanon implanted in her arm on May 15, 2023.  Pt states she hasn't had a period in months, states she is currently using multiple pads an hour.  Pt states she usually has heavy bleeding, but this is the heaviest it's ever been.  Pt denies any dizziness, weakness, or SOB.  Endorses abd pain.  Pt answering questions appropriately, speaking in complete sentences, respirations even and unlabored.  Aao x 4.

## 2023-06-12 NOTE — FIRST PROVIDER EVALUATION
Emergency Department TeleTriage Encounter Note      CHIEF COMPLAINT    Chief Complaint   Patient presents with    Vaginal Bleeding     Heavy vaginal bleeding with menstrual cycle. Saturating 1 tampon/hour.        VITAL SIGNS   Initial Vitals [06/12/23 1403]   BP Pulse Resp Temp SpO2   121/72 (!) 112 16 99.4 °F (37.4 °C) 99 %      MAP       --            ALLERGIES    Review of patient's allergies indicates:  No Known Allergies    PROVIDER TRIAGE NOTE  Patient presents with complaint of heavy vaginal bleeding for three days. Reports nexplanon placed on 5/15. Denied urinary symptoms.      Phy:   Constitutional: well nourished, well developed, appearing stated age, NAD   HEENT: NCAT, symmetrical lids, No obvious facial deformity.  Normal phonation. Normal Conjunctiva   Neck: NAROM   Respiratory: Normal effort.  No obvious use of accessory muscles   Musculoskeletal: Moved upper extremities well   Neuro: Alert, answers questions appropriately    Psych: appropriate mood and affect      Initial orders will be placed and care will be transferred to an alternate provider when patient is roomed for a full evaluation. Any additional orders and the final disposition will be determined by that provider.        ORDERS  Labs Reviewed - No data to display    ED Orders (720h ago, onward)      None              Virtual Visit Note: The provider triage portion of this emergency department evaluation and documentation was performed via [employer], a HIPAA-compliant telemedicine application, in concert with a tele-presenter in the room. A face to face patient evaluation with one of my colleagues will occur once the patient is placed in an emergency department room.      DISCLAIMER: This note was prepared with [employer] voice recognition transcription software. Garbled syntax, mangled pronouns, and other bizarre constructions may be attributed to that software system.

## 2023-06-12 NOTE — TELEPHONE ENCOUNTER
Concern and Questions  Received: Today  Denilson Ayers sent to ROCKY Alcantar MD  Caller: Unspecified (Today,  1:19 PM)  Name of Who is Calling:  patient           What is the request in detail:  Patient stated she 05/15/2023 and had her cycle on 06/10/2023 for 3 days. Patient stated she is heavy bleeding she stated she is waking up in blood             Can the clinic reply by [facility]: No             What Number to Call Back if not in [facility]: [phone]     =========================================================    Pt advised by Triage nurse to go to ED.  Pt did go to ED.  Will arrange f/u. -- [facility]

## 2023-06-13 ENCOUNTER — TELEPHONE (OUTPATIENT)
Dept: OBSTETRICS AND GYNECOLOGY | Facility: CLINIC | Age: 23
End: 2023-06-13
Payer: MEDICAID

## 2023-06-16 ENCOUNTER — OFFICE VISIT (OUTPATIENT)
Dept: OBSTETRICS AND GYNECOLOGY | Facility: CLINIC | Age: 23
End: 2023-06-16
Payer: MEDICAID

## 2023-06-16 VITALS
SYSTOLIC BLOOD PRESSURE: 112 MMHG | DIASTOLIC BLOOD PRESSURE: 74 MMHG | BODY MASS INDEX: 34.72 KG/M2 | HEIGHT: 57 IN | WEIGHT: 160.94 LBS

## 2023-06-16 DIAGNOSIS — Z20.2 POSSIBLE EXPOSURE TO STD: ICD-10-CM

## 2023-06-16 DIAGNOSIS — Z97.5 BREAKTHROUGH BLEEDING ON NEXPLANON: Primary | ICD-10-CM

## 2023-06-16 DIAGNOSIS — A59.01 VAGINITIS DUE TO TRICHOMONAS: ICD-10-CM

## 2023-06-16 DIAGNOSIS — N92.1 BREAKTHROUGH BLEEDING ON NEXPLANON: Primary | ICD-10-CM

## 2023-06-16 LAB
B-HCG UR QL: NEGATIVE
CTP QC/QA: YES

## 2023-06-16 PROCEDURE — 1160F RVW MEDS BY RX/DR IN RCRD: CPT | Mod: CPTII,,, | Performed by: OBSTETRICS & GYNECOLOGY

## 2023-06-16 PROCEDURE — 99214 PR OFFICE/OUTPT VISIT, EST, LEVL IV, 30-39 MIN: ICD-10-PCS | Mod: S$PBB,,, | Performed by: OBSTETRICS & GYNECOLOGY

## 2023-06-16 PROCEDURE — 99213 OFFICE O/P EST LOW 20 MIN: CPT | Mod: PBBFAC,PN | Performed by: OBSTETRICS & GYNECOLOGY

## 2023-06-16 PROCEDURE — 3074F SYST BP LT 130 MM HG: CPT | Mod: CPTII,,, | Performed by: OBSTETRICS & GYNECOLOGY

## 2023-06-16 PROCEDURE — 87591 N.GONORRHOEAE DNA AMP PROB: CPT | Performed by: OBSTETRICS & GYNECOLOGY

## 2023-06-16 PROCEDURE — 3074F PR MOST RECENT SYSTOLIC BLOOD PRESSURE < 130 MM HG: ICD-10-PCS | Mod: CPTII,,, | Performed by: OBSTETRICS & GYNECOLOGY

## 2023-06-16 PROCEDURE — 3008F BODY MASS INDEX DOCD: CPT | Mod: CPTII,,, | Performed by: OBSTETRICS & GYNECOLOGY

## 2023-06-16 PROCEDURE — 99999 PR PBB SHADOW E&M-EST. PATIENT-LVL III: ICD-10-PCS | Mod: PBBFAC,,, | Performed by: OBSTETRICS & GYNECOLOGY

## 2023-06-16 PROCEDURE — 99214 OFFICE O/P EST MOD 30 MIN: CPT | Mod: S$PBB,,, | Performed by: OBSTETRICS & GYNECOLOGY

## 2023-06-16 PROCEDURE — 81025 URINE PREGNANCY TEST: CPT | Mod: PBBFAC,PN | Performed by: OBSTETRICS & GYNECOLOGY

## 2023-06-16 PROCEDURE — 1159F PR MEDICATION LIST DOCUMENTED IN MEDICAL RECORD: ICD-10-PCS | Mod: CPTII,,, | Performed by: OBSTETRICS & GYNECOLOGY

## 2023-06-16 PROCEDURE — 3078F DIAST BP <80 MM HG: CPT | Mod: CPTII,,, | Performed by: OBSTETRICS & GYNECOLOGY

## 2023-06-16 PROCEDURE — 1160F PR REVIEW ALL MEDS BY PRESCRIBER/CLIN PHARMACIST DOCUMENTED: ICD-10-PCS | Mod: CPTII,,, | Performed by: OBSTETRICS & GYNECOLOGY

## 2023-06-16 PROCEDURE — 1159F MED LIST DOCD IN RCRD: CPT | Mod: CPTII,,, | Performed by: OBSTETRICS & GYNECOLOGY

## 2023-06-16 PROCEDURE — 3008F PR BODY MASS INDEX (BMI) DOCUMENTED: ICD-10-PCS | Mod: CPTII,,, | Performed by: OBSTETRICS & GYNECOLOGY

## 2023-06-16 PROCEDURE — 3078F PR MOST RECENT DIASTOLIC BLOOD PRESSURE < 80 MM HG: ICD-10-PCS | Mod: CPTII,,, | Performed by: OBSTETRICS & GYNECOLOGY

## 2023-06-16 PROCEDURE — 99999 PR PBB SHADOW E&M-EST. PATIENT-LVL III: CPT | Mod: PBBFAC,,, | Performed by: OBSTETRICS & GYNECOLOGY

## 2023-06-16 RX ORDER — IBUPROFEN 600 MG/1
1200 TABLET ORAL 2 TIMES DAILY
COMMUNITY
Start: 2023-06-12 | End: 2023-08-15

## 2023-06-16 NOTE — PROGRESS NOTES
"Past medical, surgical, social, family, and obstetric histories; medications; prior records and results; and available outside records were reviewed and updated in the EMR.  Pertinent findings were noted below.    Reason for Visit   Vaginal Bleeding    HPI   22 y.o. female     No LMP recorded.    5/15/2023: Nexplanon placed.  No bleeding immediately after placement  2023: Presented to ED with heavy bleeding x2 days  +trich - Pt has not started taking flagyl yet.  Bleeding is slowing down now.    Contraception: Nexplanon  Pap: No result found, <21  Mammogram: N/A    Exam   /74   Ht 4' 9" (1.448 m)   Wt 73 kg (160 lb 15 oz)   BMI 34.83 kg/m²     Physical Exam  Genitourinary:      Vulva normal.      Right Labia: No rash, lesions or Bartholin's cyst.     Left Labia: No lesions, Bartholin's cyst or rash.     Vaginal bleeding (Mild) present.      No vaginal discharge.      No cervical discharge or lesion.      Pelvic exam was performed with patient in the lithotomy position.   Exam conducted with a chaperone present.     Assessment and Plan   Breakthrough bleeding on Nexplanon  -     POCT Urine Pregnancy  -     C. trachomatis/N. gonorrhoeae by AMP DNA [facility]; Cervix    Possible exposure to STD  -     C. trachomatis/N. gonorrhoeae by AMP DNA [facility]; Cervix    Vaginitis due to Trichomonas      Irregular bleeding with Nexplanon vs bleeding 2/2 infection.  UPT(-)  Encouraged pt to take flagyl.  Check for other infectious causes.  Discussed expectant management vs OCPs for bleeding control.  Pt desires expectant management.    "

## 2023-06-18 LAB
C TRACH DNA SPEC QL NAA+PROBE: NOT DETECTED
N GONORRHOEA DNA SPEC QL NAA+PROBE: NOT DETECTED

## 2023-07-11 ENCOUNTER — PATIENT MESSAGE (OUTPATIENT)
Dept: OBSTETRICS AND GYNECOLOGY | Facility: CLINIC | Age: 23
End: 2023-07-11
Payer: MEDICAID

## 2023-08-13 ENCOUNTER — HOSPITAL ENCOUNTER (EMERGENCY)
Facility: OTHER | Age: 23
Discharge: HOME | End: 2023-08-14
Attending: EMERGENCY MEDICINE
Payer: MEDICAID

## 2023-08-13 VITALS
RESPIRATION RATE: 16 BRPM | BODY MASS INDEX: 34.73 KG/M2 | OXYGEN SATURATION: 99 % | TEMPERATURE: 98 F | HEIGHT: 57 IN | WEIGHT: 161 LBS | DIASTOLIC BLOOD PRESSURE: 81 MMHG | SYSTOLIC BLOOD PRESSURE: 144 MMHG | HEART RATE: 82 BPM

## 2023-08-13 DIAGNOSIS — Z04.9 CONDITION NOT FOUND: Primary | ICD-10-CM

## 2023-08-13 LAB — POCT GLUCOSE: 88 MG/DL (ref 70–110)

## 2023-08-13 PROCEDURE — 99282 EMERGENCY DEPT VISIT SF MDM: CPT

## 2023-08-13 PROCEDURE — 82962 GLUCOSE BLOOD TEST: CPT

## 2023-08-14 NOTE — ED TRIAGE NOTES
Pt's pcp told her she was a pre-diabetic and the pt is concerned for her glucose - pt's glucose is 88

## 2023-08-14 NOTE — ED PROVIDER NOTES
Encounter Date: 8/13/2023    SCRIBE #1 NOTE: I, Hilary Pérez, am scribing for, and in the presence of,  Addie Piedra MD.       History     Chief Complaint   Patient presents with    Diabetes     Pt's pcp told her she was a pre-diabetic and the pt is concerned for her glucose - pt's glucose is 88     Carri Garcia is a 23 y.o. female, who presents to the ED to get her glucose checked.  Pt was recently evaluated by her PCP and was told she was pre-diabetic, stating she is now concerned for her glucose. She states she is currently asymptomatic but is worried about her sugar because she has been eating sugary foods. This is the extent of the patient's complaints at this time.    The history is provided by the patient.     Review of patient's allergies indicates:  No Known Allergies  History reviewed. No pertinent past medical history.  History reviewed. No pertinent surgical history.  Family History   Problem Relation Age of Onset    Breast cancer Neg Hx     Colon cancer Neg Hx     Ovarian cancer Neg Hx      Social History     Tobacco Use    Smoking status: Never    Smokeless tobacco: Never   Substance Use Topics    Alcohol use: No    Drug use: No     Review of Systems   Constitutional:  Negative for chills and fever.   HENT:  Negative for congestion and sore throat.    Eyes:  Negative for visual disturbance.   Respiratory:  Negative for cough and shortness of breath.    Cardiovascular:  Negative for chest pain and palpitations.   Gastrointestinal:  Negative for abdominal pain, diarrhea and vomiting.   Genitourinary:  Negative for decreased urine volume, dysuria and vaginal discharge.   Musculoskeletal:  Negative for joint swelling, neck pain and neck stiffness.   Skin:  Negative for rash and wound.   Neurological:  Negative for weakness, numbness and headaches.   Psychiatric/Behavioral:  Negative for confusion.        Physical Exam     Initial Vitals [08/13/23 2358]   BP Pulse Resp Temp SpO2   (!) 144/81 82 16 98.1 °F  (36.7 °C) 99 %      MAP       --         Physical Exam    Constitutional: She appears well-developed and well-nourished. She is not diaphoretic. She does not have a sickly appearance. No distress.   HENT:   Head: Normocephalic and atraumatic.   Right Ear: External ear normal.   Left Ear: External ear normal.   Eyes: Conjunctivae, EOM and lids are normal. Right eye exhibits no discharge. Left eye exhibits no discharge. Right conjunctiva is not injected. Right conjunctiva has no hemorrhage. Left conjunctiva is not injected. Left conjunctiva has no hemorrhage. No scleral icterus.   Neck: Phonation normal. No stridor present. No tracheal deviation present.   Normal range of motion.  Cardiovascular:            Pulses:       Radial pulses are 2+ on the right side and 2+ on the left side.        Dorsalis pedis pulses are 2+ on the right side and 2+ on the left side.   Musculoskeletal:      Cervical back: Normal range of motion.     Neurological: She is alert and oriented to person, place, and time. She has normal strength. GCS score is 15. GCS eye subscore is 4. GCS verbal subscore is 5. GCS motor subscore is 6.   Psychiatric: She has a normal mood and affect. Her speech is normal and behavior is normal. Judgment and thought content normal. Cognition and memory are normal.         ED Course   Procedures  Labs Reviewed   POCT GLUCOSE          Imaging Results    None          Medications - No data to display  Medical Decision Making:   History:   Old Medical Records: I decided to obtain old medical records.  Clinical Tests:   Lab Tests: Ordered and Reviewed    Additional MDM:   Comments: 23-year-old female recently told she was prediabetic presents asymptomatic.  Vital signs within normal limits.  Blood glucose less than 100.  Patient was reassured.  I do not feel any further emergent evaluation is warranted at this time.  She already has glucometer which she purchased and has referral to dietitian for further education.   She will be discharged at this time stable condition..        Scribe Attestation:   Scribe #1: I performed the above scribed service and the documentation accurately describes the services I performed. I attest to the accuracy of the note.      I, Addie Piedra , personally performed the services described in this documentation. All medical record entries made by the scribe were at my direction and in my presence. I have reviewed the chart and agree that the record reflects my personal performance and is accurate and complete.                   Clinical Impression:   Final diagnoses:  [Z04.9] Condition not found (Primary)        ED Disposition Condition    Discharge Stable          ED Prescriptions    None       Follow-up Information       Follow up With Specialties Details Why Contact Info    your primary care doctor  Schedule an appointment as soon as possible for a visit                Addie Piedra MD  08/14/23 0030

## 2023-08-15 ENCOUNTER — PROCEDURE VISIT (OUTPATIENT)
Dept: OBSTETRICS AND GYNECOLOGY | Facility: CLINIC | Age: 23
End: 2023-08-15
Payer: MEDICAID

## 2023-08-15 VITALS
DIASTOLIC BLOOD PRESSURE: 80 MMHG | WEIGHT: 157.44 LBS | SYSTOLIC BLOOD PRESSURE: 118 MMHG | HEIGHT: 57 IN | BODY MASS INDEX: 33.96 KG/M2

## 2023-08-15 DIAGNOSIS — Z30.46 ENCOUNTER FOR NEXPLANON REMOVAL: Primary | ICD-10-CM

## 2023-08-15 PROCEDURE — 99499 NO LOS: ICD-10-PCS | Mod: S$PBB,,, | Performed by: OBSTETRICS & GYNECOLOGY

## 2023-08-15 PROCEDURE — 11982 PR REMOVAL DRUG IMPLANT DEVICE: ICD-10-PCS | Mod: S$PBB,,, | Performed by: OBSTETRICS & GYNECOLOGY

## 2023-08-15 PROCEDURE — 11982 REMOVE DRUG IMPLANT DEVICE: CPT | Mod: PBBFAC | Performed by: OBSTETRICS & GYNECOLOGY

## 2023-08-15 PROCEDURE — 11982 REMOVE DRUG IMPLANT DEVICE: CPT | Mod: S$PBB,,, | Performed by: OBSTETRICS & GYNECOLOGY

## 2023-08-15 PROCEDURE — 99499 UNLISTED E&M SERVICE: CPT | Mod: S$PBB,,, | Performed by: OBSTETRICS & GYNECOLOGY

## 2023-08-15 NOTE — PROCEDURES
"[facility]  Ambulatory Clinic   Obstetrics & Gynecology    Date:  8/15/2023    Procedure:  Nexplanon removal (CPT 01019)    LMP:  Patient's last menstrual period was 07/27/2023.    UPT:  Negative    Indication:  Change in birth control method    History:      Carri Garcia is a 23 y.o. G0, here for Nexplanon removal.  Pt states she would like to take a break from birth control and will practice natural family planning.  We discussed the risks, benefits, alternatives, and possible complications to Nexplanon removal and all indicated procedures in detail.  All of her questions were answered to her satisfaction.  She voiced understanding and informed consent was obtained.    Vitals:  /80   Ht 4' 9" (1.448 m)   Wt 71.4 kg (157 lb 6.5 oz)   LMP 07/27/2023   BMI 34.06 kg/m²     Procedure Details:      A time out was performed to confirmed the correct patient and procedure.      The procedure site was prepped with betadine.      Nexplanon was palpated in good position along the medial aspect of left arm between biceps and triceps.    2 mL of 1% lidocaine with epinephrine was injected at the site of incision near the tip of Nexplanon is closest to the elbow.    A 2-3 mm incision in the longitudinal direction of the arm at the tip of the implant closest to the elbow was made with a scalpel.      The Nexplanon was pushed toward the incision until the tip is visible and grasped curved mosquito forceps and gently pulled out without difficulty.    The incision was re-approximated with steri-strips and pressure bandage with sterile gauze was applied.    Patient tolerated the procedure well.  Sterile technique maintained.  Hemostasis noted.  VSSAF, pain scale 0/10 at end of procedure.    Impression:  Intact Nexplanon    Plan:      Usual post procedure warnings and aftercare instructions reviewed.  Pt was advised to call for any fever or prolonged or severe pain, bleeding, or swelling.  She was " also advised to use OTC analgesics as needed for mild to moderate pain.      All of her questions were answered to her satisfaction, pt voiced understanding.     Follow-up:  1 year for annual GYN, or sooner for any concerns.       Magan López MD

## 2023-08-17 ENCOUNTER — PATIENT OUTREACH (OUTPATIENT)
Dept: EMERGENCY MEDICINE | Facility: OTHER | Age: 23
End: 2023-08-17

## 2023-09-07 ENCOUNTER — IMMUNIZATION (OUTPATIENT)
Dept: FAMILY MEDICINE | Facility: CLINIC | Age: 23
End: 2023-09-07
Payer: MEDICAID

## 2023-09-07 DIAGNOSIS — Z23 FLU VACCINE NEED: Primary | ICD-10-CM

## 2023-09-07 PROCEDURE — 99999PBSHW FLU VACCINE (QUAD) GREATER THAN OR EQUAL TO 3YO PRESERVATIVE FREE IM: Mod: PBBFAC,,,

## 2023-09-07 PROCEDURE — 99999PBSHW FLU VACCINE (QUAD) GREATER THAN OR EQUAL TO 3YO PRESERVATIVE FREE IM: ICD-10-PCS | Mod: PBBFAC,,,

## 2023-09-07 PROCEDURE — 90686 IIV4 VACC NO PRSV 0.5 ML IM: CPT | Mod: PBBFAC,PO

## 2023-09-28 ENCOUNTER — HOSPITAL ENCOUNTER (EMERGENCY)
Facility: OTHER | Age: 23
Discharge: HOME | End: 2023-09-28
Attending: EMERGENCY MEDICINE
Payer: MEDICAID

## 2023-09-28 VITALS
RESPIRATION RATE: 18 BRPM | SYSTOLIC BLOOD PRESSURE: 137 MMHG | TEMPERATURE: 98 F | HEART RATE: 69 BPM | OXYGEN SATURATION: 100 % | DIASTOLIC BLOOD PRESSURE: 76 MMHG

## 2023-09-28 DIAGNOSIS — B37.31 VAGINAL YEAST INFECTION: Primary | ICD-10-CM

## 2023-09-28 LAB
B-HCG UR QL: NEGATIVE
BACTERIA GENITAL QL WET PREP: ABNORMAL
CLUE CELLS VAG QL WET PREP: ABNORMAL
CTP QC/QA: YES
FILAMENT FUNGI VAG WET PREP-#/AREA: ABNORMAL
SPECIMEN SOURCE: ABNORMAL
T VAGINALIS GENITAL QL WET PREP: ABNORMAL
WBC #/AREA VAG WET PREP: ABNORMAL
YEAST GENITAL QL WET PREP: ABNORMAL

## 2023-09-28 PROCEDURE — 63700000 PHARM REV CODE 250 ALT 637 W/O HCPCS

## 2023-09-28 PROCEDURE — 99284 EMERGENCY DEPT VISIT MOD MDM: CPT

## 2023-09-28 PROCEDURE — 87210 SMEAR WET MOUNT SALINE/INK: CPT | Performed by: PHYSICIAN ASSISTANT

## 2023-09-28 PROCEDURE — 87591 N.GONORRHOEAE DNA AMP PROB: CPT

## 2023-09-28 PROCEDURE — 81025 URINE PREGNANCY TEST: CPT | Performed by: PHYSICIAN ASSISTANT

## 2023-09-28 RX ORDER — FLUCONAZOLE 100 MG/1
200 TABLET ORAL
Status: COMPLETED | OUTPATIENT
Start: 2023-09-28 | End: 2023-09-28

## 2023-09-28 RX ADMIN — FLUCONAZOLE 200 MG: 100 TABLET ORAL at 06:09

## 2023-09-28 NOTE — FIRST PROVIDER EVALUATION
Emergency Department TeleTriage Encounter Note      CHIEF COMPLAINT    Chief Complaint   Patient presents with    Vaginal Itching     C/o vaginal itching & discharge x2 days.        VITAL SIGNS   Initial Vitals [09/28/23 1702]   BP Pulse Resp Temp SpO2   138/76 81 20 98.2 °F (36.8 °C) 100 %      MAP       --            ALLERGIES    Review of patient's allergies indicates:  No Known Allergies    PROVIDER TRIAGE NOTE  Patient presents with vaginal itching and discharge for 2 days.       ORDERS  Labs Reviewed - No data to display    ED Orders (720h ago, onward)      None              Virtual Visit Note: The provider triage portion of this emergency department evaluation and documentation was performed via [employer], a HIPAA-compliant telemedicine application, in concert with a tele-presenter in the room. A face to face patient evaluation with one of my colleagues will occur once the patient is placed in an emergency department room.      DISCLAIMER: This note was prepared with [employer] voice recognition transcription software. Garbled syntax, mangled pronouns, and other bizarre constructions may be attributed to that software system.

## 2023-09-28 NOTE — ED TRIAGE NOTES
Pt reports to the ED with vaginal itching and abnormal discharge that started about 2 days ago. Pt denies fever, painful urination, or abdominal pain. Aaox4.

## 2023-09-28 NOTE — ED PROVIDER NOTES
Encounter Date: 9/28/2023       History     Chief Complaint   Patient presents with    Vaginal Itching     C/o vaginal itching & discharge x2 days.      This is a 23-year-old female who presents to the ED with complaints of vaginal itching and discharge x3 days. Patient states that she was diagnosed with BV and completed a course of Flagyl which she finished 2 days ago. States that about 3 days ago she started having itching and irritation vagina. Does report white, thick and clumpy vaginal discharge that she did not previously have.  Denies fever, abdominal pain, N/V/D, vaginal bleeding, dysuria or urinary symptoms.    The history is provided by the patient.     Review of patient's allergies indicates:  No Known Allergies  History reviewed. No pertinent past medical history.  History reviewed. No pertinent surgical history.  Family History   Problem Relation Age of Onset    Breast cancer Neg Hx     Colon cancer Neg Hx     Ovarian cancer Neg Hx      Social History     Tobacco Use    Smoking status: Never    Smokeless tobacco: Never   Substance Use Topics    Alcohol use: No    Drug use: No     Review of Systems   Constitutional:  Negative for chills and fever.   HENT:  Negative for congestion, sinus pressure and sore throat.    Eyes:  Negative for pain.   Respiratory:  Negative for cough and shortness of breath.    Cardiovascular:  Negative for chest pain.   Gastrointestinal:  Negative for abdominal pain, constipation, diarrhea, nausea and vomiting.   Genitourinary:  Positive for vaginal discharge and vaginal pain. Negative for dysuria and hematuria.   Musculoskeletal:  Negative for arthralgias and myalgias.   Skin: Negative.    Neurological:  Negative for weakness, numbness and headaches.   Hematological: Negative.    Psychiatric/Behavioral:  Negative for agitation and confusion.        Physical Exam     Initial Vitals [09/28/23 1702]   BP Pulse Resp Temp SpO2   138/76 81 20 98.2 °F (36.8 °C) 100 %      MAP       --          Physical Exam    Constitutional: She appears well-developed and well-nourished.  Non-toxic appearance. She does not appear ill. No distress.   HENT:   Head: Normocephalic and atraumatic.   Eyes: Conjunctivae are normal.   Neck: Neck supple.   Cardiovascular:  Normal rate and regular rhythm.           Pulmonary/Chest: Effort normal. No tachypnea. No respiratory distress.   Genitourinary: Cervix exhibits no motion tenderness, no discharge and no friability. Right adnexum displays no mass, no tenderness and no fullness. Left adnexum displays no mass, no tenderness and no fullness.    Vaginal discharge present.      Genitourinary Comments: Pelvic exam performed with female chaperone at bedside    Thick white discharge within vaginal vault.  Beefy red labia bilaterally     Musculoskeletal:      Cervical back: Neck supple.     Neurological: She is alert and oriented to person, place, and time.   Skin: Skin is warm, dry and intact.   Psychiatric: She has a normal mood and affect. Her speech is normal and behavior is normal.         ED Course   Procedures  Labs Reviewed   VAGINAL SCREEN - Abnormal; Notable for the following components:       Result Value    Fungal Hyphae Rare (*)     WBC - Vaginal Screen Rare (*)     Bacteria - Vaginal Screen Rare (*)     All other components within normal limits    Narrative:     Release to patient->Immediate   C. TRACHOMATIS/N. GONORRHOEAE BY AMP DNA   POCT URINE PREGNANCY          Imaging Results    None          Medications   fluconazole tablet 200 mg (200 mg Oral Given 9/28/23 1839)     Medical Decision Making  Urgent evaluation of a 23-year-old female with vaginal irritation and discharge.  Patient recently finished a course of Flagyl and developed symptoms a couple of days prior to finishing.  Symptoms and recent antibiotic use consistent with vaginal candidiasis.  Will perform pelvic exam and obtain vaginal screen. She has ob/gyn follow up scheduled in a couple of  weeks.    Problems Addressed:  Vaginal yeast infection:     Details: Vaginal screen does show fungal hyphae.  Patient was treated with single dose Diflucan here in the ED. Pelvic exam without any CMT and patient afebrile nontoxic-appearing, low concern for PID at this time. Patient appropriately treated here in the ED and has follow up scheduled on 10/16.  Patient given return precautions and educated on worrisome symptoms for which they should return to the ER, including pelvic or abdominal pain, fever, dysuria. She reported understanding and all questions were answered.    Amount and/or Complexity of Data Reviewed  Labs: ordered.    Risk  Prescription drug management.                               Clinical Impression:   Final diagnoses:  [B37.31] Vaginal yeast infection (Primary)        ED Disposition Condition    Discharge Stable          ED Prescriptions    None       Follow-up Information       Follow up With Specialties Details Why Contact Info    [facility] - Emergency Dept Emergency Medicine  If symptoms worsen [address] [address]  [phone]             Ludivina Ingram PA-C  09/28/23 9891

## 2023-09-29 LAB
C TRACH DNA SPEC QL NAA+PROBE: NOT DETECTED
N GONORRHOEA DNA SPEC QL NAA+PROBE: NOT DETECTED

## 2023-09-29 NOTE — DISCHARGE INSTRUCTIONS
You were treated for a vaginal yeast infection here in the ER.  This can be caused by taking antibiotics, which you recently finished. You do not need additional prescriptions at this time.    Please make sure you follow-up with your Ob/gyn on 10/16.

## 2023-10-16 ENCOUNTER — OFFICE VISIT (OUTPATIENT)
Dept: OBSTETRICS AND GYNECOLOGY | Facility: CLINIC | Age: 23
End: 2023-10-16
Payer: MEDICAID

## 2023-10-16 VITALS
SYSTOLIC BLOOD PRESSURE: 110 MMHG | WEIGHT: 161.19 LBS | BODY MASS INDEX: 34.87 KG/M2 | DIASTOLIC BLOOD PRESSURE: 80 MMHG

## 2023-10-16 DIAGNOSIS — Z11.3 SCREEN FOR STD (SEXUALLY TRANSMITTED DISEASE): ICD-10-CM

## 2023-10-16 DIAGNOSIS — Z12.39 SCREENING BREAST EXAMINATION: ICD-10-CM

## 2023-10-16 DIAGNOSIS — Z01.419 ENCOUNTER FOR GYNECOLOGICAL EXAMINATION WITHOUT ABNORMAL FINDING: Primary | ICD-10-CM

## 2023-10-16 PROCEDURE — 99999 PR PBB SHADOW E&M-EST. PATIENT-LVL II: CPT | Mod: PBBFAC,,, | Performed by: OBSTETRICS & GYNECOLOGY

## 2023-10-16 PROCEDURE — 99999 PR PBB SHADOW E&M-EST. PATIENT-LVL II: ICD-10-PCS | Mod: PBBFAC,,, | Performed by: OBSTETRICS & GYNECOLOGY

## 2023-10-16 PROCEDURE — 3074F SYST BP LT 130 MM HG: CPT | Mod: CPTII,,, | Performed by: OBSTETRICS & GYNECOLOGY

## 2023-10-16 PROCEDURE — 99395 PR PREVENTIVE VISIT,EST,18-39: ICD-10-PCS | Mod: S$PBB,,, | Performed by: OBSTETRICS & GYNECOLOGY

## 2023-10-16 PROCEDURE — 3079F PR MOST RECENT DIASTOLIC BLOOD PRESSURE 80-89 MM HG: ICD-10-PCS | Mod: CPTII,,, | Performed by: OBSTETRICS & GYNECOLOGY

## 2023-10-16 PROCEDURE — 81514 NFCT DS BV&VAGINITIS DNA ALG: CPT | Performed by: OBSTETRICS & GYNECOLOGY

## 2023-10-16 PROCEDURE — 1159F MED LIST DOCD IN RCRD: CPT | Mod: CPTII,,, | Performed by: OBSTETRICS & GYNECOLOGY

## 2023-10-16 PROCEDURE — 3008F BODY MASS INDEX DOCD: CPT | Mod: CPTII,,, | Performed by: OBSTETRICS & GYNECOLOGY

## 2023-10-16 PROCEDURE — 87591 N.GONORRHOEAE DNA AMP PROB: CPT | Performed by: OBSTETRICS & GYNECOLOGY

## 2023-10-16 PROCEDURE — 99395 PREV VISIT EST AGE 18-39: CPT | Mod: S$PBB,,, | Performed by: OBSTETRICS & GYNECOLOGY

## 2023-10-16 PROCEDURE — 3044F HG A1C LEVEL LT 7.0%: CPT | Mod: CPTII,,, | Performed by: OBSTETRICS & GYNECOLOGY

## 2023-10-16 PROCEDURE — 99212 OFFICE O/P EST SF 10 MIN: CPT | Mod: PBBFAC | Performed by: OBSTETRICS & GYNECOLOGY

## 2023-10-16 PROCEDURE — 3044F PR MOST RECENT HEMOGLOBIN A1C LEVEL <7.0%: ICD-10-PCS | Mod: CPTII,,, | Performed by: OBSTETRICS & GYNECOLOGY

## 2023-10-16 PROCEDURE — 88175 CYTOPATH C/V AUTO FLUID REDO: CPT | Performed by: OBSTETRICS & GYNECOLOGY

## 2023-10-16 PROCEDURE — 3074F PR MOST RECENT SYSTOLIC BLOOD PRESSURE < 130 MM HG: ICD-10-PCS | Mod: CPTII,,, | Performed by: OBSTETRICS & GYNECOLOGY

## 2023-10-16 PROCEDURE — 3008F PR BODY MASS INDEX (BMI) DOCUMENTED: ICD-10-PCS | Mod: CPTII,,, | Performed by: OBSTETRICS & GYNECOLOGY

## 2023-10-16 PROCEDURE — 1159F PR MEDICATION LIST DOCUMENTED IN MEDICAL RECORD: ICD-10-PCS | Mod: CPTII,,, | Performed by: OBSTETRICS & GYNECOLOGY

## 2023-10-16 PROCEDURE — 3079F DIAST BP 80-89 MM HG: CPT | Mod: CPTII,,, | Performed by: OBSTETRICS & GYNECOLOGY

## 2023-10-16 RX ORDER — NORGESTIMATE AND ETHINYL ESTRADIOL 0.25-0.035
1 KIT ORAL DAILY
Qty: 28 TABLET | Refills: 12 | Status: SHIPPED | OUTPATIENT
Start: 2023-10-16

## 2023-10-16 NOTE — PROGRESS NOTES
Subjective:      Patient ID: Carri Garcia is a 23 y.o. female.    Chief Complaint:  Well Woman      History of Present Illness  HPI  Annual Exam-Premenopausal  Patient presents for annual exam. The patient has no complaints today. The patient is sexually active. GYN screening history: last pap: was normal. The patient wears seatbelts: yes. The patient participates in regular exercise: yes. Has the patient ever been transfused or tattooed?: yes. The patient reports that there is not domestic violence in her life.    GYN & OB History  No LMP recorded (lmp unknown).   Date of Last Pap: No result found    OB History    Para Term  AB Living   0 0 0 0 0 0   SAB IAB Ectopic Multiple Live Births   0 0 0 0 0     Past Medical History:  No past medical history on file.    Past Surgical History:  No past surgical history on file.    Family History:  Family History   Problem Relation Age of Onset    Breast cancer Neg Hx     Colon cancer Neg Hx     Ovarian cancer Neg Hx        Allergies:  Review of patient's allergies indicates:  No Known Allergies    Medications:  No current outpatient medications on file prior to visit.     Current Facility-Administered Medications on File Prior to Visit   Medication Dose Route Frequency Provider Last Rate Last Admin    [DISCONTINUED] etonogestreL subdermal device 68 mg  68 mg Implant  ROCKY Alcantar MD   68 mg at 05/15/23 1100       Social History:  Social History     Tobacco Use    Smoking status: Never     Passive exposure: Never    Smokeless tobacco: Never   Substance Use Topics    Alcohol use: No    Drug use: No            Review of Systems  Review of Systems   Constitutional: Negative.    HENT: Negative.     Eyes: Negative.    Respiratory: Negative.     Cardiovascular: Negative.    Gastrointestinal: Negative.    Endocrine: Negative.    Genitourinary: Negative.    Musculoskeletal: Negative.    Integumentary:  Negative.   Neurological: Negative.    Hematological:  Negative.    Psychiatric/Behavioral: Negative.     Breast: negative.           Objective:     Physical Exam:   Constitutional: She is oriented to person, place, and time. She appears well-nourished.    HENT:   Head: Normocephalic and atraumatic.    Eyes: EOM are normal. Right eye exhibits normal extraocular motion. Left eye exhibits normal extraocular motion.    Neck: No thyromegaly present.    Cardiovascular:  Normal rate.             Pulmonary/Chest: Effort normal. No respiratory distress. Right breast exhibits no mass, no skin change and no tenderness. Left breast exhibits no mass, no skin change and no tenderness. Breasts are symmetrical.        Abdominal: Soft. She exhibits no distension and no mass. There is no abdominal tenderness.     Genitourinary:    Vagina, uterus, right adnexa and left adnexa normal.      Pelvic exam was performed with patient supine.   The external female genitalia was normal.   No external genitalia lesions identified,Genitalia hair distrobution normal .   Labial bartholins normal.There is no rash or lesion on the right labia. There is no rash or lesion on the left labia. Cervix is normal. Right adnexum displays no tenderness and no fullness. Left adnexum displays no tenderness and no fullness. No  no vaginal discharge or bleeding in the vagina.    No signs of injury in the vagina.   Vagina was moist.Cervix exhibits no motion tenderness and no friability. Uterus consistancy normal. and Uerus contour normal  Uterus is not tender. Normal urethral meatus.Urethral Meatus exhibits: urethral lesion and prolapsedUrethra findings: no urethral mass and no tendernessBladder findings: no bladder distention and no bladder tenderness          Musculoskeletal: Normal range of motion.      Lymphadenopathy:     She has no cervical adenopathy.    Neurological: She is oriented to person, place, and time.   Cranial Nerves II-XII grossly intact.    Skin: No rash noted. No erythema.    Psychiatric: She has  a normal mood and affect. Her behavior is normal.         Assessment:     1. Encounter for gynecological examination without abnormal finding    2. Screening breast examination    3. Screen for STD (sexually transmitted disease)               Plan:     1. Encounter for gynecological examination without abnormal finding  - Pap done today.  -   Screening tests as ordered.  - Diet and exercise encouraged.  Condom use encouraged for STD prevention.    Counseling: injury prevention: Driving under the influence of alcohol  Seatbelts  Stress management techniques  indications for and frequency of periodic gynecologic exam  reviewed current Pap guidelines. Explained new understanding of natural history of cervical disease and improved Paps. Recommended guideline concordant care.  - Liquid-Based Pap Smear, Screening  - norgestimate-ethinyl estradioL (ORTHO-CYCLEN) 0.25-35 mg-mcg per tablet; Take 1 tablet by mouth once daily.  Dispense: 28 tablet; Refill: 12    2. Screening breast examination  - Self breast exams encouraged    3. Screen for STD (sexually transmitted disease)  - C. trachomatis/N. gonorrhoeae by AMP DNA [facility]; Cervicovaginal  - Vaginosis Screen by DNA Probe

## 2023-10-17 LAB
C TRACH DNA SPEC QL NAA+PROBE: NOT DETECTED
N GONORRHOEA DNA SPEC QL NAA+PROBE: NOT DETECTED

## 2023-10-18 LAB
BACTERIAL VAGINOSIS DNA: NEGATIVE
CANDIDA GLABRATA DNA: NEGATIVE
CANDIDA KRUSEI DNA: NEGATIVE
CANDIDA RRNA VAG QL PROBE: NEGATIVE
T VAGINALIS RRNA GENITAL QL PROBE: NEGATIVE

## 2023-10-19 ENCOUNTER — PATIENT MESSAGE (OUTPATIENT)
Dept: OBSTETRICS AND GYNECOLOGY | Facility: CLINIC | Age: 23
End: 2023-10-19
Payer: MEDICAID

## 2023-10-19 ENCOUNTER — TELEPHONE (OUTPATIENT)
Dept: OBSTETRICS AND GYNECOLOGY | Facility: CLINIC | Age: 23
End: 2023-10-19
Payer: MEDICAID

## 2023-10-19 LAB
FINAL PATHOLOGIC DIAGNOSIS: NORMAL
Lab: NORMAL

## 2023-10-19 NOTE — TELEPHONE ENCOUNTER
Pt call was returned. Pt was informed at this time the provider did not read her pap results. Once she does someone from our office will give her a call.            ----- Message from Sophia Ledesma sent at 10/19/2023  1:15 PM CDT -----  Pt would like to be called back regarding  pet scann result    Pt can be reached at  [phone]

## 2024-08-29 NOTE — TELEPHONE ENCOUNTER
----- Message from Mallika Pavon MA sent at 6/12/2023  3:57 PM CDT -----  Regarding: FW: Concern and Questions    ----- Message -----  From: ROCKY Alcantar MD  Sent: 6/12/2023   2:44 PM CDT  To: Katharine Martinez Staff  Subject: FW: Concern and Questions                        Pt is currently in the ED.  Please call her tomorrow and arrange a f/u appointment.  Thanks.    ----- Message -----  From: Denilson Ayers  Sent: 6/12/2023   1:22 PM CDT  To: ROCKY Alcantar MD  Subject: Concern and Questions                            Name of Who is Calling:  patient          What is the request in detail:  Patient stated she 05/15/2023 and had her cycle on 06/10/2023 for 3 days. Patient stated she is heavy bleeding she stated she is waking up in blood            Can the clinic reply by [facility]: No            What Number to Call Back if not in [facility]: [phone]            
Call returned appt has been scheduled at [facility]. All questions answered   
Hx of falls/IV and/or equipment tethered to patient/Weakness/Other

## 2024-10-05 ENCOUNTER — HOSPITAL ENCOUNTER (EMERGENCY)
Facility: OTHER | Age: 24
Discharge: HOME | End: 2024-10-05
Attending: EMERGENCY MEDICINE
Payer: MEDICAID

## 2024-10-05 VITALS
HEART RATE: 69 BPM | WEIGHT: 150 LBS | SYSTOLIC BLOOD PRESSURE: 142 MMHG | HEIGHT: 57 IN | RESPIRATION RATE: 17 BRPM | DIASTOLIC BLOOD PRESSURE: 89 MMHG | BODY MASS INDEX: 32.36 KG/M2 | TEMPERATURE: 99 F | OXYGEN SATURATION: 99 %

## 2024-10-05 DIAGNOSIS — N89.8 VAGINAL ITCHING: ICD-10-CM

## 2024-10-05 DIAGNOSIS — N76.0 ACUTE VAGINITIS: Primary | ICD-10-CM

## 2024-10-05 LAB
B-HCG UR QL: NEGATIVE
BACTERIA GENITAL QL WET PREP: ABNORMAL
BILIRUB UR QL STRIP: NEGATIVE
CLARITY UR: CLEAR
CLUE CELLS VAG QL WET PREP: ABNORMAL
COLOR UR: YELLOW
CTP QC/QA: YES
FILAMENT FUNGI VAG WET PREP-#/AREA: ABNORMAL
GLUCOSE UR QL STRIP: NEGATIVE
HGB UR QL STRIP: NEGATIVE
KETONES UR QL STRIP: NEGATIVE
LEUKOCYTE ESTERASE UR QL STRIP: NEGATIVE
NITRITE UR QL STRIP: NEGATIVE
PH UR STRIP: 7 [PH] (ref 5–8)
PROT UR QL STRIP: ABNORMAL
SP GR UR STRIP: >1.03 (ref 1–1.03)
SPECIMEN SOURCE: ABNORMAL
T VAGINALIS GENITAL QL WET PREP: ABNORMAL
URN SPEC COLLECT METH UR: ABNORMAL
UROBILINOGEN UR STRIP-ACNC: NEGATIVE EU/DL
WBC #/AREA VAG WET PREP: ABNORMAL
YEAST GENITAL QL WET PREP: ABNORMAL

## 2024-10-05 PROCEDURE — 87491 CHLMYD TRACH DNA AMP PROBE: CPT | Performed by: EMERGENCY MEDICINE

## 2024-10-05 PROCEDURE — 87210 SMEAR WET MOUNT SALINE/INK: CPT | Performed by: EMERGENCY MEDICINE

## 2024-10-05 PROCEDURE — 87591 N.GONORRHOEAE DNA AMP PROB: CPT | Performed by: EMERGENCY MEDICINE

## 2024-10-05 PROCEDURE — 99284 EMERGENCY DEPT VISIT MOD MDM: CPT

## 2024-10-05 PROCEDURE — 81003 URINALYSIS AUTO W/O SCOPE: CPT | Performed by: EMERGENCY MEDICINE

## 2024-10-05 PROCEDURE — 81025 URINE PREGNANCY TEST: CPT | Performed by: EMERGENCY MEDICINE

## 2024-10-05 RX ORDER — FLUCONAZOLE 150 MG/1
150 TABLET ORAL DAILY
Qty: 1 TABLET | Refills: 0 | Status: SHIPPED | OUTPATIENT
Start: 2024-10-05 | End: 2024-10-06

## 2024-10-05 NOTE — DISCHARGE INSTRUCTIONS
Do not use any soap or douche on the vagina.  You may use soap on your external labia, but on your internal labia (between the lips), you should only clean with water.  If you take a bath, make sure to rinse your genital area with plain water afterwards to prevent further infection.

## 2024-10-05 NOTE — ED PROVIDER NOTES
Encounter Date: 10/5/2024       History     Chief Complaint   Patient presents with    Vaginal Itching     C/o 3-4 days vaginal itching and discharge,      23 yo female presents via personal transportation to [facility] with vaginal itching and discharge.  Patient notes that itching began a little over a week ago (Weds 9/25/24).  Patient went to urgent care ([facility]) last week (Mon 9/30/24) and was diagnosed with a yeast infection and trichomonas.  She was rx'ed Metronidazole for trich, which she finished; she also took Fluconazole 150mg both Tues 10/1/24 and Fri 10/4/24.      Patient has been seen at this facility and several others for similar starting in 2022.  She attributes this to one particular sexual partner, with whom she is no longer involved.  She states she was last sexually involved with this person about 2 weeks ago.          Review of patient's allergies indicates:  No Known Allergies  No past medical history on file.  History reviewed. No pertinent surgical history.  Family History   Problem Relation Name Age of Onset    Breast cancer Neg Hx      Colon cancer Neg Hx      Ovarian cancer Neg Hx       Social History     Tobacco Use    Smoking status: Never     Passive exposure: Never    Smokeless tobacco: Never   Substance Use Topics    Alcohol use: No    Drug use: No     Review of Systems   Constitutional:  Negative for fever.   HENT:  Negative for rhinorrhea.    Eyes:  Negative for visual disturbance.   Respiratory:  Negative for shortness of breath.    Cardiovascular:  Negative for chest pain.   Gastrointestinal:  Negative for abdominal pain.   Genitourinary:  Positive for vaginal discharge. Negative for dysuria.   Musculoskeletal:  Negative for gait problem.   Skin:  Negative for wound.   Neurological:  Negative for syncope.       Physical Exam     Initial Vitals [10/05/24 0347]   BP Pulse Resp Temp SpO2   (!) 154/80 70 20 97.8 °F (36.6 °C) 99 %      MAP       --         Physical  Exam    Nursing note and vitals reviewed.  Constitutional: She appears well-developed and well-nourished. She is not diaphoretic.   Awake, alert, nontoxic, speaking in complete sentences.   HENT:   Head: Normocephalic and atraumatic.   Eyes: Conjunctivae and EOM are normal. Pupils are equal, round, and reactive to light.   Neck: Neck supple.   Normal range of motion.  Cardiovascular:  Normal rate.           Pulmonary/Chest: No respiratory distress.   Abdominal: Abdomen is soft. There is no abdominal tenderness.   Genitourinary:    Genitourinary Comments: Mild dryness to external and internal labia.  Mild to moderate thick white discharge in vaginal vault.  No bleeding or lesions.  No CMT.  No adnexal masses.  LPN Sabina yang.     Musculoskeletal:         General: Normal range of motion.      Cervical back: Normal range of motion and neck supple.     Neurological: She is alert and oriented to person, place, and time.   Moving all extremities.   Skin: Skin is warm and dry. No erythema. No pallor.   Psychiatric: She has a normal mood and affect.         ED Course   Procedures  Labs Reviewed   VAGINAL SCREEN - Abnormal       Result Value    Trichomonas None      Clue Cells Rare (*)     Budding Yeast None      Fungal Hyphae None      WBC - Vaginal Screen None      Bacteria - Vaginal Screen Few (*)     Wet Prep Source Vagina      Narrative:     Release to patient->Immediate   URINALYSIS, REFLEX TO URINE CULTURE - Abnormal    Specimen UA Urine, Clean Catch      Color, UA Yellow      Appearance, UA Clear      pH, UA 7.0      Specific Gravity, UA >1.030 (*)     Protein, UA Trace (*)     Glucose, UA Negative      Ketones, UA Negative      Bilirubin (UA) Negative      Occult Blood UA Negative      Nitrite, UA Negative      Urobilinogen, UA Negative      Leukocytes, UA Negative      Narrative:     Specimen Source->Urine   C. TRACHOMATIS/N. GONORRHOEAE BY AMP DNA   POCT URINE PREGNANCY    POC Preg Test, Ur Negative        Acceptable Yes            Imaging Results    None          Medications - No data to display  Medical Decision Making  25 yo female with vaginal itching and discharge.    Ddx includes UTI, pyelonephritis, vaginal candidiasis, BV, STI, PID, TOA, ovarian torsion, pregnancy, ectopic, other.     UPT negative.    UA without infection.  [facility]ia [facility], patient was negative for gonorrhea and chlamydia 9/18/24, negative for hep C and hep B on 9/30/24, and nonreactive for HIV and RPR on 9/30/24.    I explained that I have sent repeat GC/CT testing and results will be available in a few days.    Wet prep here overall unimpressive.      I have discussed vaginal hygiene with patient.  She is using Dove soap on her genitals.  I have advised her to only use plain water to cleanse her vaginal area.  I have advised rinsing her genitals with plain water after she takes a bath.      I have advised patient that Diflucan doses are supposed to be  by 1 week.  I have rx'ed additional Diflucan but told patient to wait until this coming Friday to see if her symptoms vira by then.      I have advised discussion of recent trichomonas with any sexual partners.    D/c'ed, VSS.      Amount and/or Complexity of Data Reviewed  Labs: ordered.    Risk  Prescription drug management.                                      Clinical Impression:  Final diagnoses:  [N76.0] Acute vaginitis (Primary)  [N89.8] Vaginal itching          ED Disposition Condition    Discharge Stable          ED Prescriptions       Medication Sig Dispense Start Date End Date Auth. Provider    fluconazole (DIFLUCAN) 150 MG Tab (Expires today) Take 1 tablet (150 mg total) by mouth once daily. Take once on Friday 10/11/24 if you continue to have vaginal discharge / itching. for 1 day 1 tablet 10/5/2024 10/6/2024 Yina Weinstein MD          Follow-up Information       Follow up With Specialties Details Why Contact Info    Delmy Hamilton MD  Obstetrics and Gynecology On 10/7/2024 as scheduled [address] [facility] [address]  [address]  [place] [address]  [phone]               Yina Weinstein MD  10/06/24 1418

## 2024-10-07 LAB
C TRACH DNA SPEC QL NAA+PROBE: NOT DETECTED
N GONORRHOEA DNA SPEC QL NAA+PROBE: NOT DETECTED

## 2024-11-22 ENCOUNTER — PATIENT MESSAGE (OUTPATIENT)
Dept: RESEARCH | Facility: HOSPITAL | Age: 24
End: 2024-11-22
Payer: MEDICAID

## 2025-04-09 ENCOUNTER — HOSPITAL ENCOUNTER (EMERGENCY)
Facility: HOSPITAL | Age: 25
Discharge: HOME | End: 2025-04-09
Attending: STUDENT IN AN ORGANIZED HEALTH CARE EDUCATION/TRAINING PROGRAM

## 2025-04-09 VITALS
HEART RATE: 80 BPM | HEIGHT: 57 IN | RESPIRATION RATE: 18 BRPM | DIASTOLIC BLOOD PRESSURE: 78 MMHG | SYSTOLIC BLOOD PRESSURE: 142 MMHG | WEIGHT: 150 LBS | TEMPERATURE: 98 F | OXYGEN SATURATION: 99 % | BODY MASS INDEX: 32.36 KG/M2

## 2025-04-09 DIAGNOSIS — N89.8 VAGINAL DISCHARGE: Primary | ICD-10-CM

## 2025-04-09 LAB
B-HCG UR QL: NEGATIVE
BILIRUBIN, POC UA: NEGATIVE
BLOOD, POC UA: NEGATIVE
CLARITY, UA: CLEAR
COLOR, UA: YELLOW
CTP QC/QA: YES
GLUCOSE, POC UA: NEGATIVE
KETONES, POC UA: NEGATIVE
LEUKOCYTE EST, POC UA: NEGATIVE
NITRITE, POC UA: NEGATIVE
PH UR STRIP: 5.5 [PH] (ref 5–8)
PROTEIN, POC UA: NEGATIVE
SPECIFIC GRAVITY, POC UA: >=1.03 (ref 1–1.03)
UROBILINOGEN, POC UA: 0.2 E.U./DL

## 2025-04-09 PROCEDURE — 81025 URINE PREGNANCY TEST: CPT | Mod: ER | Performed by: STUDENT IN AN ORGANIZED HEALTH CARE EDUCATION/TRAINING PROGRAM

## 2025-04-09 PROCEDURE — 81515 NFCT DS BV&VAGINITIS DNA ALG: CPT | Performed by: STUDENT IN AN ORGANIZED HEALTH CARE EDUCATION/TRAINING PROGRAM

## 2025-04-09 PROCEDURE — 81025 URINE PREGNANCY TEST: CPT | Mod: ER

## 2025-04-09 PROCEDURE — 99282 EMERGENCY DEPT VISIT SF MDM: CPT | Mod: 25,ER

## 2025-04-09 NOTE — ED PROVIDER NOTES
"Encounter Date: 4/9/2025       History     Chief Complaint   Patient presents with    Vaginal Discharge     C/O VAGINAL DISCHARGE X 3 DAYS     24 year old female presents for evaluation of creamy, fishy discharge from her vagina, and she reports that "my pH is off". She denies vaginal pain, denies new lesions, denies dysuria, hematuria urgency or frequency.       Review of patient's allergies indicates:  No Known Allergies  No past medical history on file.  No past surgical history on file.  Family History   Problem Relation Name Age of Onset    Breast cancer Neg Hx      Colon cancer Neg Hx      Ovarian cancer Neg Hx       Social History[1]  Review of Systems   Genitourinary:  Positive for vaginal discharge.       Physical Exam     Initial Vitals [04/09/25 0411]   BP Pulse Resp Temp SpO2   -- 80 18 98 °F (36.7 °C) 99 %      MAP       --         Physical Exam    Nursing note and vitals reviewed.  Constitutional: She appears well-developed and well-nourished. She is not diaphoretic.   HENT:   Head: Normocephalic and atraumatic. Mouth/Throat: Oropharynx is clear and moist.   Eyes: EOM are normal. Pupils are equal, round, and reactive to light. Right eye exhibits no discharge. Left eye exhibits no discharge.   Neck: No tracheal deviation present.   Normal range of motion.  Cardiovascular:  Normal rate, regular rhythm and intact distal pulses.           Pulmonary/Chest: No respiratory distress. She has no wheezes. She exhibits no tenderness.   Abdominal: Abdomen is soft. She exhibits no distension. There is no abdominal tenderness.   Musculoskeletal:         General: No tenderness or edema. Normal range of motion.      Cervical back: Normal range of motion.     Neurological: She is alert and oriented to person, place, and time. She has normal strength. No cranial nerve deficit or sensory deficit. GCS eye subscore is 4. GCS verbal subscore is 5. GCS motor subscore is 6.   Skin: Skin is warm and dry. No rash noted. "   Psychiatric: She has a normal mood and affect. Her behavior is normal. Thought content normal.         ED Course   Procedures  Labs Reviewed   POCT URINALYSIS W/O SCOPE - Abnormal       Result Value    Glucose, UA Negative      Bilirubin, UA Negative      Ketones, UA Negative      Spec Grav UA >=1.030 (*)     Blood, UA Negative      PH, UA 5.5      Protein, UA Negative      Urobilinogen, UA 0.2      Nitrite, UA Negative      Leukocytes, UA Negative      Color, UA POC Yellow      Clarity, UA, POC Clear     VAGINOSIS SCREEN BY DNA PROBE   POCT URINE PREGNANCY    POC Preg Test, Ur Negative       Acceptable Yes     POCT URINALYSIS W/O SCOPE          Imaging Results    None          Medications - No data to display  Medical Decision Making  24 year old female has creamy white, foul smelling vaginal discharge, concerning her for either a yeast infection or BV. UA normal. UPT negative. Wet prep sent. She does not have time to stay for the results and [facility] Advised her to avoid detergents, douches, unnecessary antibiotics, and counselled her on safe sex.    Amount and/or Complexity of Data Reviewed  Labs: ordered.                                      Clinical Impression:  Final diagnoses:  [N89.8] Vaginal discharge (Primary)                   [1]   Social History  Tobacco Use    Smoking status: Never     Passive exposure: Never    Smokeless tobacco: Never   Substance Use Topics    Alcohol use: No    Drug use: No        Phil Linn MD  04/09/25 6414

## 2025-04-12 ENCOUNTER — RESULTS FOLLOW-UP (OUTPATIENT)
Dept: EMERGENCY MEDICINE | Facility: HOSPITAL | Age: 25
End: 2025-04-12

## 2025-04-12 LAB
BACTERIAL VAGINOSIS DNA (OHS): DETECTED
CANDIDA GLABRATA/KRUSEI DNA (OHS): NOT DETECTED
CANDIDA SPECIES DNA (OHS): DETECTED
TRICHOMONAS VAGINALIS DNA (OHS): NOT DETECTED

## 2025-04-13 ENCOUNTER — TELEPHONE (OUTPATIENT)
Dept: EMERGENCY MEDICINE | Facility: HOSPITAL | Age: 25
End: 2025-04-13

## 2025-04-13 RX ORDER — FLUCONAZOLE 150 MG/1
150 TABLET ORAL DAILY
Qty: 2 TABLET | Refills: 0 | Status: SHIPPED | OUTPATIENT
Start: 2025-04-13 | End: 2025-04-15

## 2025-04-13 RX ORDER — METRONIDAZOLE 500 MG/1
500 TABLET ORAL 2 TIMES DAILY
Qty: 14 TABLET | Refills: 0 | Status: SHIPPED | OUTPATIENT
Start: 2025-04-13 | End: 2025-04-20

## 2025-07-23 ENCOUNTER — HOSPITAL ENCOUNTER (EMERGENCY)
Facility: HOSPITAL | Age: 25
Discharge: HOME | End: 2025-07-23
Attending: STUDENT IN AN ORGANIZED HEALTH CARE EDUCATION/TRAINING PROGRAM
Payer: COMMERCIAL

## 2025-07-23 VITALS
SYSTOLIC BLOOD PRESSURE: 134 MMHG | DIASTOLIC BLOOD PRESSURE: 88 MMHG | BODY MASS INDEX: 32.36 KG/M2 | HEIGHT: 57 IN | RESPIRATION RATE: 18 BRPM | OXYGEN SATURATION: 98 % | TEMPERATURE: 98 F | WEIGHT: 150 LBS | HEART RATE: 80 BPM

## 2025-07-23 DIAGNOSIS — Z20.2 POSSIBLE EXPOSURE TO STD: Primary | ICD-10-CM

## 2025-07-23 DIAGNOSIS — N89.8 VAGINAL IRRITATION: ICD-10-CM

## 2025-07-23 DIAGNOSIS — Z71.1 CONCERN ABOUT STD IN FEMALE WITHOUT DIAGNOSIS: ICD-10-CM

## 2025-07-23 DIAGNOSIS — N89.8 VAGINAL DISCHARGE: ICD-10-CM

## 2025-07-23 LAB
B-HCG UR QL: NEGATIVE
BILIRUBIN, POC UA: NEGATIVE
BLOOD, POC UA: NEGATIVE
CLARITY, UA: CLEAR
COLOR, UA: YELLOW
CTP QC/QA: YES
GLUCOSE, POC UA: NEGATIVE
KETONES, POC UA: NEGATIVE
LEUKOCYTE EST, POC UA: NEGATIVE
NITRITE, POC UA: NEGATIVE
PH UR STRIP: 6 [PH] (ref 5–8)
PROTEIN, POC UA: NEGATIVE
SPECIFIC GRAVITY, POC UA: >=1.03 (ref 1–1.03)
UROBILINOGEN, POC UA: 0.2 E.U./DL

## 2025-07-23 PROCEDURE — 81025 URINE PREGNANCY TEST: CPT | Mod: ER | Performed by: STUDENT IN AN ORGANIZED HEALTH CARE EDUCATION/TRAINING PROGRAM

## 2025-07-23 PROCEDURE — 99284 EMERGENCY DEPT VISIT MOD MDM: CPT | Mod: 25,ER

## 2025-07-23 PROCEDURE — 63600175 PHARM REV CODE 636 W HCPCS: Mod: ER | Performed by: STUDENT IN AN ORGANIZED HEALTH CARE EDUCATION/TRAINING PROGRAM

## 2025-07-23 PROCEDURE — 96372 THER/PROPH/DIAG INJ SC/IM: CPT | Performed by: STUDENT IN AN ORGANIZED HEALTH CARE EDUCATION/TRAINING PROGRAM

## 2025-07-23 PROCEDURE — 81515 NFCT DS BV&VAGINITIS DNA ALG: CPT | Performed by: STUDENT IN AN ORGANIZED HEALTH CARE EDUCATION/TRAINING PROGRAM

## 2025-07-23 PROCEDURE — 87491 CHLMYD TRACH DNA AMP PROBE: CPT | Performed by: STUDENT IN AN ORGANIZED HEALTH CARE EDUCATION/TRAINING PROGRAM

## 2025-07-23 PROCEDURE — 81025 URINE PREGNANCY TEST: CPT | Mod: ER

## 2025-07-23 RX ORDER — CEFTRIAXONE 500 MG/1
0.5 INJECTION, POWDER, FOR SOLUTION INTRAMUSCULAR; INTRAVENOUS
Status: COMPLETED | OUTPATIENT
Start: 2025-07-23 | End: 2025-07-23

## 2025-07-23 RX ORDER — FLUCONAZOLE 200 MG/1
200 TABLET ORAL ONCE
Qty: 1 TABLET | Refills: 0 | Status: SHIPPED | OUTPATIENT
Start: 2025-07-23 | End: 2025-07-23

## 2025-07-23 RX ORDER — DOXYCYCLINE 100 MG/1
100 CAPSULE ORAL 2 TIMES DAILY
Qty: 20 CAPSULE | Refills: 0 | Status: SHIPPED | OUTPATIENT
Start: 2025-07-23 | End: 2025-08-02

## 2025-07-23 RX ORDER — MENTHOL 4.5 MG/1
LOZENGE ORAL NIGHTLY
Qty: 28 G | Refills: 0 | Status: SHIPPED | OUTPATIENT
Start: 2025-07-23

## 2025-07-23 RX ADMIN — CEFTRIAXONE SODIUM 0.5 G: 500 INJECTION, POWDER, FOR SOLUTION INTRAMUSCULAR; INTRAVENOUS at 05:07

## 2025-07-23 NOTE — ED PROVIDER NOTES
Encounter Date: 7/23/2025       History     Chief Complaint   Patient presents with    Vaginal Discharge     Pt reports vaginal discharge and irritation x 3 days.      HPI    24-year-old presenting to the emergency department for evaluation of vaginal discharge, white clumpy and irritation for 3 days.  She notes pruritus to the area.  She notes monogamous with 1 male partner.  She denies condom use.  She has a history of multiple visits for vaginal discharge and similar symptoms.  States that she is trying to follow up with the OBGYN.  She denies fever, chills, chest pain, shortness for breath, abdominal pain, dysuria, hematuria, flank pain.  No other mitigating exacerbating factors.  Review of patient's allergies indicates:  No Known Allergies  History reviewed. No pertinent past medical history.  History reviewed. No pertinent surgical history.  Family History   Problem Relation Name Age of Onset    Breast cancer Neg Hx      Colon cancer Neg Hx      Ovarian cancer Neg Hx       Social History[1]  Review of Systems  See HPI  Physical Exam     Initial Vitals [07/23/25 0505]   BP Pulse Resp Temp SpO2   134/88 80 18 98 °F (36.7 °C) 98 %      MAP       --         Physical Exam    Nursing note and vitals reviewed.  Constitutional: She appears well-developed and well-nourished. She is not diaphoretic. No distress.   HENT:   Head: Normocephalic and atraumatic.   Right Ear: External ear normal.   Left Ear: External ear normal.   Nose: Nose normal.   Eyes: Conjunctivae are normal. No scleral icterus.   Neck: Neck supple. No tracheal deviation present.   Normal range of motion.  Cardiovascular:  Normal rate, regular rhythm, normal heart sounds and intact distal pulses.     Exam reveals no gallop and no friction rub.       No murmur heard.  Pulmonary/Chest: Breath sounds normal. No respiratory distress.   Abdominal: Abdomen is soft. Bowel sounds are normal. There is no abdominal tenderness.   Genitourinary:    Vagina normal.       No vaginal discharge.     Musculoskeletal:      Cervical back: Normal range of motion and neck supple.     Neurological: She is alert and oriented to person, place, and time. GCS score is 15. GCS eye subscore is 4. GCS verbal subscore is 5. GCS motor subscore is 6.   Skin: Skin is warm and dry.   No jaundice   Psychiatric: She has a normal mood and affect. Thought content normal.         ED Course   Procedures  Labs Reviewed   POCT URINALYSIS W/O SCOPE - Abnormal       Result Value    Glucose, UA Negative      Bilirubin, UA Negative      Ketones, UA Negative      Spec Grav UA >=1.030 (*)     Blood, UA Negative      PH, UA 6.0      Protein, UA Negative      Urobilinogen, UA 0.2      Nitrite, UA Negative      Leukocytes, UA Negative      Color, UA POC Yellow      Clarity, UA, POC Clear     C. TRACHOMATIS/N. GONORRHOEAE BY AMP DNA   VAGINOSIS SCREEN BY DNA PROBE   POCT URINE PREGNANCY    POC Preg Test, Ur Negative       Acceptable Yes     POCT URINALYSIS W/O SCOPE          Imaging Results    None          Medications   cefTRIAXone injection 0.5 g (has no administration in time range)     Medical Decision Making  Amount and/or Complexity of Data Reviewed  Labs: ordered.    Risk  OTC drugs.  Prescription drug management.               ED Course as of 07/23/25 0531   Wed Jul 23, 2025   0559 Differential Diagnosis includes, but is not limited to:  STI, HSV, BV, PID, TOA, vaginal foreign body, vaginal trauma, ovarian torsion, ovarian cyst, UTI/pyelonephritis, pregnancy complication, dysmenorrhea, mittelschmertz, appendicitis, nephrolithiasis, appendicitis, colitis, diverticulitis,  [CC]   0584 MDM: 24-year-old presenting to the emergency department for evaluation of vaginal discharge, vaginal irritation.  Discussed safe sex at length as well as not using pessary, douches or other devices.  She is not pregnant.  UA isn't indicative of UTI.  Vaginal exam unremarkable.  She notes itching.  We will prescribe  Vagisil.  She was like empiric treatment for gonorrhea chlamydia.  Test pending.  Rocephin here.  We will prescribe doxycycline.  Vitals are normal stable.  Otherwise exam is unremarkable.  All Ed testing reviewed/ and pertinent results discussed with patient.  Strict return precautions given. I believe patient is appropriate for discharge and continued outpatient evaulation/treatment.  I discussed with the patient/family the diagnosis, treatment plan, indications for return to the emergency department, and for expected follow-up. The patient/family verbalized an understanding. The patient/family  asked if there are any questions or concerns. We discuss the case, until all issues are addressed to the patient/family's satisfaction. Patient/family understands and is agreeable to the plan. Patient is stable and ready for discharge.   [CC]      ED Course User Index  [CC] Diogenes Payne MD                               Clinical Impression:  Final diagnoses:  [Z20.2] Possible exposure to STD (Primary)  [N89.8] Vaginal irritation  [N89.8] Vaginal discharge  [Z71.1] Concern about STD in female without diagnosis          ED Disposition Condition    Discharge Stable          ED Prescriptions       Medication Sig Dispense Start Date End Date Auth. Provider    doxycycline (VIBRAMYCIN) 100 MG Cap Take 1 capsule (100 mg total) by mouth 2 (two) times daily. for 10 days 20 capsule 7/23/2025 8/2/2025 Diogenes Pyane MD    benzocaine-resorcinoL (VAGISIL) 5-2 % vaginal cream Place vaginally nightly. 28 g 7/23/2025 -- Diogenes Payne MD          Follow-up Information       Follow up With Specialties Details Why Contact [facility] ED Emergency Medicine Go to  If symptoms worsen [address] [facility] [id number] [phone]    Your PCP  Schedule an appointment as soon as possible for a visit       OBGYN  Schedule an appointment as soon as possible for a visit                       [1]   Social History  Tobacco Use    Smoking status: Never     Passive exposure: Never    Smokeless tobacco: Never   Substance Use Topics    Alcohol use: No    Drug use: No        Diogenes Payne MD  07/23/25 0531

## 2025-07-30 LAB
BACTERIAL VAGINOSIS DNA (OHS): DETECTED
C TRACH DNA SPEC QL NAA+PROBE: NOT DETECTED
CANDIDA GLABRATA/KRUSEI DNA (OHS): NOT DETECTED
CANDIDA SPECIES DNA (OHS): NOT DETECTED
CTGC SOURCE (OHS) ORD-325: NORMAL
N GONORRHOEA DNA UR QL NAA+PROBE: NOT DETECTED
TRICHOMONAS VAGINALIS DNA (OHS): NOT DETECTED

## 2025-07-31 ENCOUNTER — TELEPHONE (OUTPATIENT)
Dept: EMERGENCY MEDICINE | Facility: HOSPITAL | Age: 25
End: 2025-07-31
Payer: COMMERCIAL

## 2025-07-31 DIAGNOSIS — N76.0 BACTERIAL VAGINOSIS: Primary | ICD-10-CM

## 2025-07-31 DIAGNOSIS — B96.89 BACTERIAL VAGINOSIS: Primary | ICD-10-CM

## 2025-07-31 RX ORDER — METRONIDAZOLE 500 MG/1
500 TABLET ORAL 2 TIMES DAILY
Qty: 14 TABLET | Refills: 0 | Status: SHIPPED | OUTPATIENT
Start: 2025-07-31 | End: 2025-08-07